# Patient Record
Sex: MALE | Race: WHITE
[De-identification: names, ages, dates, MRNs, and addresses within clinical notes are randomized per-mention and may not be internally consistent; named-entity substitution may affect disease eponyms.]

---

## 2017-05-18 ENCOUNTER — HOSPITAL ENCOUNTER (EMERGENCY)
Dept: HOSPITAL 42 - ED | Age: 62
LOS: 1 days | Discharge: HOME | End: 2017-05-19
Payer: MEDICAID

## 2017-05-18 VITALS
RESPIRATION RATE: 16 BRPM | TEMPERATURE: 97.7 F | DIASTOLIC BLOOD PRESSURE: 72 MMHG | HEART RATE: 72 BPM | SYSTOLIC BLOOD PRESSURE: 134 MMHG

## 2017-05-18 VITALS — BODY MASS INDEX: 28.7 KG/M2

## 2017-05-18 DIAGNOSIS — K11.8: Primary | ICD-10-CM

## 2017-05-18 LAB
ADD MANUAL DIFF?: NO
ALBUMIN/GLOB SERPL: 1.1 {RATIO} (ref 1.1–1.8)
ALP SERPL-CCNC: 66 U/L (ref 38–133)
ALT SERPL-CCNC: 32 U/L (ref 7–56)
AST SERPL-CCNC: 20 U/L (ref 15–59)
BASOPHILS # BLD AUTO: 0.03 K/MM3 (ref 0–2)
BASOPHILS NFR BLD: 0.4 % (ref 0–3)
BILIRUB SERPL-MCNC: 0.6 MG/DL (ref 0.2–1.3)
BUN SERPL-MCNC: 17 MG/DL (ref 7–21)
CALCIUM SERPL-MCNC: 9.4 MG/DL (ref 8.4–10.5)
CHLORIDE SERPL-SCNC: 99 MMOL/L (ref 98–107)
CO2 SERPL-SCNC: 28 MMOL/L (ref 21–33)
EOSINOPHIL # BLD: 0.2 10*3/UL (ref 0–0.7)
EOSINOPHIL NFR BLD: 2.2 % (ref 1.5–5)
ERYTHROCYTE [DISTWIDTH] IN BLOOD BY AUTOMATED COUNT: 13.4 % (ref 11.5–14.5)
GLOBULIN SER-MCNC: 3.7 GM/DL
GLUCOSE SERPL-MCNC: 178 MG/DL (ref 70–110)
GRANULOCYTES # BLD: 4.83 10*3/UL (ref 1.4–6.5)
GRANULOCYTES NFR BLD: 60.2 % (ref 50–68)
HCT VFR BLD CALC: 44.7 % (ref 42–52)
LYMPHOCYTES # BLD: 2.5 10*3/UL (ref 1.2–3.4)
LYMPHOCYTES NFR BLD AUTO: 31.6 % (ref 22–35)
MCH RBC QN AUTO: 29.7 PG (ref 25–35)
MCHC RBC AUTO-ENTMCNC: 34.5 G/DL (ref 31–37)
MCV RBC AUTO: 86.3 FL (ref 80–105)
MONOCYTES # BLD AUTO: 0.5 10*3/UL (ref 0.1–0.6)
MONOCYTES NFR BLD: 5.6 % (ref 1–6)
PLATELET # BLD: 251 10^3/UL (ref 120–450)
PMV BLD AUTO: 11.2 FL (ref 7–11)
POTASSIUM SERPL-SCNC: 4.6 MMOL/L (ref 3.6–5)
PROT SERPL-MCNC: 7.7 G/DL (ref 5.8–8.3)
SODIUM SERPL-SCNC: 138 MMOL/L (ref 132–148)
WBC # BLD AUTO: 8 10^3/UL (ref 4.5–11)

## 2017-05-18 PROCEDURE — 70491 CT SOFT TISSUE NECK W/DYE: CPT

## 2017-05-18 PROCEDURE — 99283 EMERGENCY DEPT VISIT LOW MDM: CPT

## 2017-05-18 PROCEDURE — 93971 EXTREMITY STUDY: CPT

## 2017-05-18 PROCEDURE — 85025 COMPLETE CBC W/AUTO DIFF WBC: CPT

## 2017-05-18 PROCEDURE — 80053 COMPREHEN METABOLIC PANEL: CPT

## 2017-05-18 NOTE — ED PDOC
Arrival/HPI





<Bj Jose DO - Last Filed: 05/19/17 00:07>





- General


Historian: Patient





- History of Present Illness


Time/Duration: > month


Symptom Onset: Gradual


Symptom Course: Worsening


Context: Home





<Doyle Ro - Last Filed: 05/21/17 15:46>





- General


Chief Complaint: ENT Problem


Time Seen by Provider: 05/18/17 19:58





- History of Present Illness


Narrative History of Present Illness (Text): 





05/18/17 20:00


This 62 yo male with pmh tobacco use,  presents to this ED c/o left mandibular 

mass x 4-5 weeks.  Patient stated mass has worsen within last few days.  Denies 

ear pain, sore throat, cough, sob, cp, abdominal pain, dizziness, or skin rash.


Patient noted left calf pain x 4 weeks. (Doyle Ro)





Past Medical History





- Provider Review


Nursing Documentation Reviewed: Yes





- Cardiac


Hx Cardiac Disorders: No





- Pulmonary


Hx Respiratory Disorders: No





- Neurological


Hx Neurological Disorder: No





- HEENT


Hx HEENT Disorder: No





- Renal


Hx Renal Disorder: No





- Endocrine/Metabolic


Hx Diabetes Mellitus Type 2: Yes





- Hematological/Oncological


Hx Blood Disorders: No





- Integumentary


Hx Dermatological Disorder: No





- Musculoskeletal/Rheumatological


Hx Musculoskeletal Disorders: No





- Gastrointestinal


Hx Gastrointestinal Disorders: No





- Genitourinary/Gynecological


Hx Genitourinary Disorders: No





- Psychiatric


Hx Psychophysiologic Disorder: No


Hx Substance Use: No





- Anesthesia


Hx Anesthesia: No


Hx Anesthesia Reactions: No


Hx Malignant Hyperthermia: No





<Doyle Ro P - Last Filed: 05/21/17 15:46>





Family/Social History





- Physician Review


Nursing Documentation Reviewed: Yes


Family/Social History: No Known Family HX


Smoking Status: Heavy Smoker > 10 Cigarettes Daily


Hx Alcohol Use: No


Hx Substance Use: No





<Doyle Ro P - Last Filed: 05/21/17 15:46>





Allergies/Home Meds





<Bj Jose DO - Last Filed: 05/19/17 00:07>





<Doyle Ro - Last Filed: 05/21/17 15:46>


Allergies/Adverse Reactions: 


Allergies





No Known Allergies Allergy (Verified 05/18/17 19:48)


 








Home Medications: 


 Home Meds











 Medication  Instructions  Recorded  Confirmed


 


Famotidine [Pepcid AC] 0 PO 05/18/17 














Review of Systems





- Review of Systems


Constitutional: Normal.  absent: Fatigue, Weight Change, Fevers


Eyes: Normal


ENT: Other (Left neck mass)


Respiratory: Normal.  absent: SOB, Cough


Cardiovascular: Normal.  absent: Chest Pain, Palpitations


Gastrointestinal: Normal.  absent: Abdominal Pain, Nausea, Vomiting


Genitourinary Male: Normal.  absent: Dysuria


Musculoskeletal: Other (Left calf pain).  absent: Back Pain, Neck Pain, Joint 

Swelling


Skin: Normal, Other (See HPI)


Neurological: Normal


Endocrine: Normal


Hemo/Lymphatic: Normal


Psychiatric: Normal





<Ro,Nahim P - Last Filed: 05/21/17 15:46>





Physical Exam


Temperature: Afebrile


Blood Pressure: Normal


Pulse: Regular


Respiratory Rate: Normal


Appearance: Positive for: Well-Appearing, Non-Toxic, Comfortable


Pain Distress: None


Mental Status: Positive for: Alert and Oriented X 3





- Systems Exam


Head: Present: Atraumatic, Normocephalic


Pupils: Present: PERRL


Extroacular Muscles: Present: EOMI


Conjunctiva: Present: Normal


Mouth: Present: Moist Mucous Membranes


Neck: Present: Normal Range of Motion


Respiratory/Chest: Present: Clear to Auscultation, Good Air Exchange.  No: 

Respiratory Distress, Accessory Muscle Use


Cardiovascular: Present: Regular Rate and Rhythm, Normal S1, S2.  No: Murmurs


Abdomen: Present: Normal Bowel Sounds.  No: Tenderness, Distention, Peritoneal 

Signs


Back: Present: Normal Inspection


Upper Extremity: Present: Normal Inspection.  No: Cyanosis, Edema


Lower Extremity: Present: Normal Inspection.  No: Edema


Neurological: Present: GCS=15, CN II-XII Intact, Speech Normal


Skin: Present: Warm, Dry, Normal Color.  No: Rashes


Psychiatric: Present: Alert, Oriented x 3, Normal Insight, Normal Concentration





<Ro,Nahim P - Last Filed: 05/21/17 15:46>


Vital Signs











  Temp Pulse Resp BP Pulse Ox


 


 05/18/17 23:20    16   98


 


 05/18/17 20:03  97.7 F  72  16  134/72  99














Medical Decision Making





- RAD Interpretation


: Radiologist





<Bj Jose DO - Last Filed: 05/19/17 00:07>


Re-evaluation Time: 02:15


Reassessment Condition: Re-examined, Unchanged





<Ro,Nahim P - Last Filed: 05/21/17 15:46>


ED Course and Treatment: 





05/19/17 00:08


CT neck results reviewed:


IMPRESSION:


2.3 x 3 x 3.4 cm enhancing mass within the left parotid gland. Question 

pleomorphic adenoma


versus other tumor. Tissue sampling will aid in evaluation.


 (Bj Jose DO)





05/18/17 21:18


pending labs, CT neck scan.  


Lower extremity venous doppler was negative as per ultrasound.


Dr. Jose is aware of this case, and he will f/u labs, and CT imaging 

result. (Doyle Ro)





- Lab Interpretations


Lab Results: 








 05/18/17 21:20 





 05/18/17 21:20 





 Lab Results





05/18/17 21:20: WBC 8.0, RBC 5.18, Hgb 15.4, Hct 44.7, MCV 86.3, MCH 29.7, MCHC 

34.5, RDW 13.4, Plt Count 251, MPV 11.2 H, Gran % 60.2, Lymph % (Auto) 31.6, 

Mono % (Auto) 5.6, Eos % (Auto) 2.2, Baso % (Auto) 0.4, Gran # 4.83, Lymph # 2.5

, Mono # 0.5, Eos # 0.2, Baso # 0.03


05/18/17 21:20: Sodium 138, Potassium 4.6, Chloride 99, Carbon Dioxide 28, 

Anion Gap 16, BUN 17, Creatinine 0.8, Est GFR (African Amer) > 60, Est GFR (Non-

Af Amer) > 60, Random Glucose 178 H, Calcium 9.4, Total Bilirubin 0.6, AST 20, 

ALT 32, Alkaline Phosphatase 66, Total Protein 7.7, Albumin 4.0, Globulin 3.7, 

Albumin/Globulin Ratio 1.1











- RAD Interpretation


Narrative RAD Interpretations (Text): 


EXAM:


CT Neck With Intravenous Contrast





FINDINGS:


Airway is unremarkable as visualized. No significant tonsillar enlargement. No 

peritonsillar abscess.


Normal epiglottis. Retropharyngeal space is unremarkable.


2.3 x 3 x 3.4 cm enhancing mass within the left parotid gland.


Shotty nodes.


Thyroid demonstrates no enlarged or calcified nodules.


Osseous structures intact.





IMPRESSION:


2.3 x 3 x 3.4 cm enhancing mass within the left parotid gland. Question 

pleomorphic adenoma


versus other tumor. Tissue sampling will aid in evaluation. (Bj Jose DO)


Radiology Orders: 








05/18/17 19:59


NECK SOFT TISSUE W/CONTRAST [CT] Stat 


DUPLEX LOWER EXTRM VEIN LEFT [US] Stat 














- Medication Orders


Current Medication Orders: 











Discontinued Medications





Iohexol (Omnipaque 350 100 Ml) Confirm Administered Dose 350 mg .ROUTE .STK-MED 

ONE


   Stop: 05/18/17 22:40











Disposition/Present on Arrival





<Bj Jose DO - Last Filed: 05/19/17 00:07>





- Present on Arrival


Any Indicators Present on Arrival: No


History of DVT/PE: No


History of Uncontrolled Diabetes: No


Urinary Catheter: No


History of Decub. Ulcer: No


History Surgical Site Infection Following: None





- Disposition


Have Diagnosis and Disposition been Completed?: Yes


Disposition Time: 03:40





<Doyle Ro - Last Filed: 05/21/17 15:46>





- Disposition


Diagnosis: 


 Parotid mass





Disposition: HOME/ ROUTINE


Condition: GOOD


Additional Instructions: 





Thank you for letting us take care of you today.  You were treated for a 

parotid mass. The emergency medical care you received today was directed at 

your acute symptoms. If you were prescribed any medication, please fill it and 

take as directed. It may take several days for your symptoms to resolve. Return 

to the Emergency Department if your symptoms worsen, do not improve, or if you 

have any other problems.





Please contact your doctor or call one of the physicians/clinics you have been 

referred to that are listed on the Patient Visit Information form that is 

included in your discharge packet. Bring any paperwork you were given at 

discharge with you along with any medications you are taking to your follow up 

visit. Our treatment cannot replace ongoing medical care by a primary care 

provider (PCP) outside of the emergency department.





Thank you for allowing the [facility] team to be part of your care today.

















Follow up with Dr. Cui (ENT) in 2-3 days for further evaluation of the 

mass on the side of your jaw.











Return to the emergency room if you have any concerns.


Prescriptions: 


Ibuprofen [Motrin] 600 mg PO Q6 PRN #20 tab


 PRN Reason: Pain, Moderate (4-7)


Referrals: 


Erich Cui DO [Staff Provider] - Follow up with primary


PCP,NO [Primary Care Provider] - Follow up with primary

## 2017-05-18 NOTE — CT
EXAM:

  CT Neck With Intravenous Contrast



CLINICAL HISTORY:

  61 years old, male; Signs and symptoms; Mass, lump, or swelling in neck; 

Additional info: Left madibular mass



TECHNIQUE:

  Axial computed tomography images of the neck with intravenous contrast.  This 

CT exam was performed using one or more of the following dose reduction 

techniques:  automated exposure control, adjustment of the mA and/or kV 

according to patient size, and/or use of iterative reconstruction technique.

  Coronal and sagittal reformatted images were created and reviewed.



CONTRAST:

  96 mL of OMNI 350 administered intravenously.



COMPARISON:

  No relevant prior studies available.



FINDINGS:



Airway is unremarkable as visualized.  No significant tonsillar enlargement.  

No peritonsillar abscess. Normal epiglottis.  Retropharyngeal space is 

unremarkable.

2.3 x 3 x 3.4 cm enhancing mass within the left parotid gland.

Shotty nodes.

Thyroid demonstrates no enlarged or calcified nodules.

Osseous structures intact.



IMPRESSION:     



2.3 x 3 x 3.4 cm enhancing mass within the left parotid gland.  Question 

pleomorphic adenoma versus other tumor.  Tissue sampling will aid in evaluation.

## 2017-05-19 VITALS — OXYGEN SATURATION: 98 %

## 2017-05-19 NOTE — US
PROCEDURE:  Left lower extremity venous US



HISTORY:

Leg pain and swelling. Evaluate for DVT.



PHYSICIAN(S):  Ean Marin MD.



TECHNIQUE:

Duplex sonography and color-flow Doppler with graded compression were 

used to evaluate the deep venous system of the left lower extremity. 



FINDINGS:

The visualized deep venous system of the left lower extremity is 

sonographically normal and compressible. Normal wave forms and 

augmentation are seen. There is no sonographic evidence for deep 

venous thrombosis in the visualized segments of the left lower 

extremity.



IMPRESSION:

1. No sonographic evidence for deep venous thrombosis in the 

visualized segments of the left lower extremity.

## 2018-08-07 ENCOUNTER — HOSPITAL ENCOUNTER (INPATIENT)
Dept: HOSPITAL 42 - ED | Age: 63
LOS: 2 days | Discharge: HOME | DRG: 853 | End: 2018-08-09
Attending: INTERNAL MEDICINE | Admitting: INTERNAL MEDICINE
Payer: MEDICAID

## 2018-08-07 VITALS — BODY MASS INDEX: 27.1 KG/M2

## 2018-08-07 DIAGNOSIS — Z91.19: ICD-10-CM

## 2018-08-07 DIAGNOSIS — F17.210: ICD-10-CM

## 2018-08-07 DIAGNOSIS — Z79.4: ICD-10-CM

## 2018-08-07 DIAGNOSIS — K21.9: ICD-10-CM

## 2018-08-07 DIAGNOSIS — E11.65: ICD-10-CM

## 2018-08-07 DIAGNOSIS — I21.4: Primary | ICD-10-CM

## 2018-08-07 DIAGNOSIS — Z95.5: ICD-10-CM

## 2018-08-07 DIAGNOSIS — E66.9: ICD-10-CM

## 2018-08-07 DIAGNOSIS — E78.5: ICD-10-CM

## 2018-08-07 DIAGNOSIS — Z83.3: ICD-10-CM

## 2018-08-07 DIAGNOSIS — I10: ICD-10-CM

## 2018-08-07 DIAGNOSIS — Z87.11: ICD-10-CM

## 2018-08-07 DIAGNOSIS — I25.110: ICD-10-CM

## 2018-08-07 DIAGNOSIS — Z79.82: ICD-10-CM

## 2018-08-07 LAB
ALBUMIN SERPL-MCNC: 4 G/DL (ref 3–4.8)
ALBUMIN/GLOB SERPL: 1.3 {RATIO} (ref 1.1–1.8)
ALT SERPL-CCNC: 23 U/L (ref 7–56)
APTT BLD: 25.1 SECONDS (ref 25.1–36.5)
AST SERPL-CCNC: 16 U/L (ref 17–59)
BASOPHILS # BLD AUTO: 0.03 K/MM3 (ref 0–2)
BASOPHILS NFR BLD: 0.4 % (ref 0–3)
BUN SERPL-MCNC: 15 MG/DL (ref 7–21)
CALCIUM SERPL-MCNC: 9.4 MG/DL (ref 8.4–10.5)
EOSINOPHIL # BLD: 0.1 10*3/UL (ref 0–0.7)
EOSINOPHIL NFR BLD: 0.9 % (ref 1.5–5)
ERYTHROCYTE [DISTWIDTH] IN BLOOD BY AUTOMATED COUNT: 13.4 % (ref 11.5–14.5)
GFR NON-AFRICAN AMERICAN: > 60
GRANULOCYTES # BLD: 5.94 10*3/UL (ref 1.4–6.5)
GRANULOCYTES NFR BLD: 75.6 % (ref 50–68)
HDLC SERPL-MCNC: 31 MG/DL (ref 29–60)
HGB BLD-MCNC: 14.4 G/DL (ref 14–18)
INR PPP: 1.05
LDLC SERPL-MCNC: 107 MG/DL (ref 0–129)
LYMPHOCYTES # BLD: 1.2 10*3/UL (ref 1.2–3.4)
LYMPHOCYTES NFR BLD AUTO: 15 % (ref 22–35)
MCH RBC QN AUTO: 29.3 PG (ref 25–35)
MCHC RBC AUTO-ENTMCNC: 34.6 G/DL (ref 31–37)
MCV RBC AUTO: 84.6 FL (ref 80–105)
MONOCYTES # BLD AUTO: 0.6 10*3/UL (ref 0.1–0.6)
MONOCYTES NFR BLD: 8.1 % (ref 1–6)
PLATELET # BLD: 193 10^3/UL (ref 120–450)
PMV BLD AUTO: 11 FL (ref 7–11)
PROTHROMBIN TIME: 12 SECONDS (ref 9.4–12.5)
RBC # BLD AUTO: 4.92 10^6/UL (ref 3.5–6.1)
TROPONIN I SERPL-MCNC: 0.01 NG/ML
WBC # BLD AUTO: 7.9 10^3/UL (ref 4.5–11)

## 2018-08-07 RX ADMIN — INSULIN HUMAN SCH: 100 INJECTION, SOLUTION PARENTERAL at 22:00

## 2018-08-07 NOTE — ED PDOC
Arrival/HPI





- General


Historian: Patient, Family





- History of Present Illness


Time/Duration: 1 hour


Symptom Onset: Sudden


Symptom Course: Resolved


Quality: Aching, Cramping, Gas Like


Severity Level: 4


Context: Standing, Walking





<Antony House - Last Filed: 08/07/18 17:49>





<Crsitiano Pavon - Last Filed: 08/07/18 18:20>





- General


Chief Complaint: Weakness/Neurological Deficit


Time Seen by Provider: 08/07/18 15:20





- History of Present Illness


Narrative History of Present Illness (Text): 





08/07/18 15:43


62 year-old male with PMH of DM2 and GERD presents to the ED complaining of 

general weakness, chest discomfort, and dizziness. Approximately one hour ago, 

the patient had just finished having a big lunch, went outside, and started 

pulling weeds in his yard. He began feeling nauseous and short of breath. 

Patient vomited and then went inside his home where he collapsed. Patient 

denies falling and hitting his head or losing consciousness. Patient currently 

states that he is feeling better but still feels very weak and his chest 

discomfort feels like acid reflux, which he deals with often. Patient admits to 

nausea, vomiting, dizziness, chest discomfort, weakness, and fatigue. Patient 

is also a smoker and reports that he smokes one pack per day for many years.


 (Antony House)





Past Medical History





- Provider Review


Nursing Documentation Reviewed: Yes





- Travel History


Have you recently traveled outside [place] w/in the past 3 mons?: No





- Cardiac


Hx Cardiac Disorders: No





- Pulmonary


Hx Respiratory Disorders: No





- Neurological


Hx Neurological Disorder: No





- HEENT


Hx HEENT Disorder: No





- Renal


Hx Renal Disorder: No





- Endocrine/Metabolic


Hx Diabetes Mellitus Type 2: Yes





- Hematological/Oncological


Hx Blood Disorders: No





- Integumentary


Hx Dermatological Disorder: No





- Musculoskeletal/Rheumatological


Hx Musculoskeletal Disorders: No





- Gastrointestinal


Hx Gastrointestinal Disorders: No





- Genitourinary/Gynecological


Hx Genitourinary Disorders: No





- Psychiatric


Hx Psychophysiologic Disorder: No


Hx Substance Use: No





- Anesthesia


Hx Anesthesia: No


Hx Anesthesia Reactions: No


Hx Malignant Hyperthermia: No





<Antony House - Last Filed: 08/07/18 17:49>





Family/Social History





- Physician Review


Nursing Documentation Reviewed: Yes


Family/Social History: Diabetes (mother)


Smoking Status: Heavy Smoker > 10 Cigarettes Daily


Hx Alcohol Use: No


Hx Substance Use: No





<Antony House - Last Filed: 08/07/18 17:49>





Allergies/Home Meds





<Antony House - Last Filed: 08/07/18 17:49>





<Cristiano Pavon - Last Filed: 08/07/18 18:20>


Allergies/Adverse Reactions: 


Allergies





No Known Allergies Allergy (Verified 08/07/18 15:27)


 











Review of Systems





- Review of Systems


Constitutional: absent: Fatigue, Fevers, Night Sweats


Eyes: absent: Vision Changes, Photophobia


ENT: absent: Hearing Changes, Sore Throat


Respiratory: SOB.  absent: Cough


Cardiovascular: Chest Pain.  absent: Palpitations, Edema, Syncope


Gastrointestinal: Nausea, Vomiting.  absent: Abdominal Pain


Genitourinary Male: absent: Dysuria


Musculoskeletal: absent: Arthralgias


Skin: absent: Rash, Pruritis


Neurological: Dizziness.  absent: Headache, Speech Changes, Facial Droop


Endocrine: Diaphoresis


Hemo/Lymphatic: absent: Adenopathy


Psychiatric: absent: Anxiety, Depression





<Antony House - Last Filed: 08/07/18 17:49>





- Physician Review


All systems were reviewed & negative as marked: Yes





<Cristiano Pavon - Last Filed: 08/07/18 18:20>





Physical Exam


Vital Signs Reviewed: Yes





<Antony House - Last Filed: 08/07/18 17:49>


Temperature: Afebrile


Blood Pressure: Normal


Pulse: Regular


Respiratory Rate: Normal


Appearance: Positive for: Well-Appearing, Non-Toxic, Uncomfortable, Other (

Diaphoretic)


Pain Distress: None


Mental Status: Positive for: Alert and Oriented X 3





- Systems Exam


Head: Present: Atraumatic, Normocephalic


Pupils: Present: PERRL


Extroacular Muscles: Present: EOMI


Conjunctiva: Present: Normal


Ears: Present: NORMAL TM, Normal Canal.  No: Erythema


Mouth: Present: Moist Mucous Membranes


Pharnyx: No: ERYTHEMA, EXUDATE, TONSILS ENLARGED


Neck: Present: Normal Range of Motion


Respiratory/Chest: Present: Clear to Auscultation, Good Air Exchange, Decreased 

Breath Sounds.  No: Respiratory Distress, Accessory Muscle Use


Cardiovascular: Present: Regular Rate and Rhythm, Normal S1, S2.  No: Murmurs


Abdomen: No: Tenderness, Distention, Peritoneal Signs, Rebound, Guarding


Back: Present: Normal Inspection


Upper Extremity: Present: Normal Inspection.  No: Cyanosis, Edema


Lower Extremity: Present: Normal Inspection.  No: Edema


Neurological: Present: GCS=15, CN II-XII Intact, Speech Normal, Motor Func 

Grossly Intact


Skin: Present: Warm, Normal Color, Diaphoretic.  No: Rashes


Psychiatric: Present: Alert, Oriented x 3, Normal Insight, Normal Concentration





<Cristiano Pavon - Last Filed: 08/07/18 18:20>


Vital Signs











  Temp Pulse Resp BP Pulse Ox


 


 08/07/18 17:08  97.7 F    


 


 08/07/18 16:57   75  18  127/64  97


 


 08/07/18 16:17   72  15   96


 


 08/07/18 15:17     128/88 














Medical Decision Making





- Lab Interpretations


I have reviewed the lab results: Yes


Interpretation: Abnormal lab values (hyperglycemia)





- RAD Interpretation


: Radiologist





- EKG Interpretation


Interpreted by ED Physician: Yes


Type: 12 lead EKG


Comparison: No previous EKG avail.





<Antony House - Last Filed: 08/07/18 17:49>





<Cristiano Pavon - Last Filed: 08/07/18 18:20>


ED Course and Treatment: 








08/07/18 16:47


-Suspicion for ACS


-EKG reviewed by STEMI cardiologist, no need for cath at this time


-Troponin pending


-Patient reports pain has improved s/p ASA and nitro paste


08/07/18 17:06


-Spoke with Dr. Singletary who agrees to admission (Antony House)





08/07/18 16:41


Seen and examined with the resident. Our history and physical exam reveals a 

gentleman with acute onset of severe diaphoresis nausea and vomiting chest pain 

near syncope and shortness of breath. He is a heavy smoker. His EKG shows 

diffuse minimal ST elevations. The EKG was reviewed by the STEMI cardiologist 

, who does not want to take the patient to the lab at this time. (

Cristiano Pavon)





- Lab Interpretations


Lab Results: 








 08/07/18 15:57 





 08/07/18 15:57 





 Lab Results





08/07/18 15:57: Lipase 249


08/07/18 15:57: PT 12.0, INR 1.05, APTT 25.1


08/07/18 15:57: WBC 7.9, RBC 4.92, Hgb 14.4, Hct 41.6 L, MCV 84.6, MCH 29.3, 

MCHC 34.6, RDW 13.4, Plt Count 193, MPV 11.0, Gran % 75.6 H, Lymph % (Auto) 

15.0 L, Mono % (Auto) 8.1 H, Eos % (Auto) 0.9 L, Baso % (Auto) 0.4, Gran # 5.94

, Lymph # (Auto) 1.2, Mono # (Auto) 0.6, Eos # (Auto) 0.1, Baso # (Auto) 0.03


08/07/18 15:57: Sodium 140, Potassium 4.1, Chloride 103, Carbon Dioxide 28, 

Anion Gap 14, BUN 15, Creatinine 1.0, Est GFR (African Amer) > 60, Est GFR (Non-

Af Amer) > 60, Random Glucose 233 H, Calcium 9.4, Phosphorus 2.3 L, Magnesium 

2.0, Total Bilirubin 0.4, AST 16 L, ALT 23, Alkaline Phosphatase 62, Lactate 

Dehydrogenase 364, Total Creatine Kinase 62, Troponin I 0.01, Total Protein 7.0

, Albumin 4.0, Globulin 3.0, Albumin/Globulin Ratio 1.3











- RAD Interpretation


Narrative RAD Interpretations (Text): 





08/07/18 17:07


CXR without acute findings (Antony House)


Radiology Orders: 








08/07/18 15:37


CHEST PORTABLE [RAD] Stat 














- EKG Interpretation


EKG Interpretation (Text): 





08/07/18 17:07


Normal sinus but with some ST abnormalities in II, III, reviewed with STEMI 

cardiologist (Antony House)





- Medication Orders


Current Medication Orders: 








Aspirin (Ecotrin)  81 mg PO DAILY RUSSELL


Insulin Human Regular (Humulin R Med)  0 units SC ACHS RUSSELL


   PRN Reason: Protocol





Discontinued Medications





Aspirin (Aspirin)  325 mg PO STAT STA


   Stop: 08/07/18 15:56


   Last Admin: 08/07/18 16:09  Dose: 325 mg





Nitroglycerin (Nitro-Bid 2% Oint)  1 ea TOP STAT STA


   Stop: 08/07/18 16:00


   Last Admin: 08/07/18 16:09  Dose: 1 ea











Disposition/Present on Arrival





- Present on Arrival


Any Indicators Present on Arrival: No


History of DVT/PE: No


History of Uncontrolled Diabetes: No


Urinary Catheter: No


History of Decub. Ulcer: No


History Surgical Site Infection Following: None





- Disposition


Disposition Time: 17:09


Patient Plan: Admission





<Antony House - Last Filed: 08/07/18 17:49>





- Present on Arrival


Any Indicators Present on Arrival: No


History of DVT/PE: No


History of Uncontrolled Diabetes: Yes


Urinary Catheter: No


History of Decub. Ulcer: No





- Disposition


Have Diagnosis and Disposition been Completed?: Yes


Patient Plan: Observation, Telemetry





<Cristiano Pavon - Last Filed: 08/07/18 18:20>





- Disposition


Diagnosis: 


 Weakness, High risk of cardiac event, Chest pain, Dyspnea, Diaphoresis, Nausea 

and vomiting, Hyperglycemia





Disposition: HOSPITALIZED


Patient Problems: 


 Current Active Problems











Problem Status Onset


 


Chest pain Acute  


 


Diaphoresis Acute  


 


Dyspnea Acute  


 


High risk of cardiac event Acute  


 


Hyperglycemia Acute  


 


Nausea and vomiting Acute  


 


Weakness Acute  











Condition: FAIR

## 2018-08-07 NOTE — CP.PCM.HP
<Micheal Camacho - Last Filed: 08/07/18 19:19>





History of Present Illness





- History of Present Illness


History of Present Illness: 


Micheal Camacho, PGY-1 History and Physical for Hospitalist Service





CC: substernal chest pain Shortness of breath, dizziness





HPI: 62 M with PMHx of GERD, PUD, and DM2 who presented with non-radiating, non-

reproducible substernal chest pain, full body weakness and dizziness after 

weeding his garden this afternoon. Per the patient, patient went outside to do 

house chores after eating a big meal. Patient typically waits to digest food 

due to his history of indigestion but was in a rush today. Patient was outside 

in the heat and felt nauseous and weak, at which point patient sat on the 

ground and vomited. Vomit was one time nonbloody and consisted of food he had 

for lunch. Patient reported relief at that point. Patient denies falling, 

hitting his head, loss of consciousness, urinary or bowel incontinence or 

vision changes. 


Patient's wife called for an ambulance. Patient received  and nitropaste

, which patient reports complete relief from.


Patient denies any previous cardiac issues or family hx of similar issues.








PMHx: GERD, DM2, L sided UE tremor


PSHx: unknown


All: NKDA


Social: tobacco 1 pk/day, denies ETOH and substance abuse.


Fam Hx: mom has DM, otherwise unknown


Home Meds: Pepcid 20, Glipizide 5, Motrin PRN








PMD: Dr. Rodriguez


Pharm: None 


 








Present on Admission





- Present on Admission


Any Indicators Present on Admission: No


History of Uncontrolled Diabetes: No





Review of Systems





- Constitutional


Constitutional: absent: Anorexia, Chills, Frequent Falls, Weight Loss, Weakness





- EENT


Eyes: absent: Other Visual Disturbances, Loss of Vision


Ears: Dizziness (has resolved since inciting event this afternoon).  absent: 

Ear Pain


Nose/Mouth/Throat: absent: Dry Mouth, Dysphagia, Hoarsness, Mouth Pain





- Cardiovascular


Cardiovascular: Chest Pain (resolved), Lightheadedness (improved).  absent: Leg 

Edema, Orthopnea, Palpitations





- Respiratory


Respiratory: absent: Cough, Hemoptysis, Stridor, Pain with Coughing





- Gastrointestinal


Gastrointestinal: absent: Abdominal Pain, Bloating





- Genitourinary


Genitourinary: absent: Change in Urinary Stream, Difficulty Urinating


Additional comments: 





reports harder stools recently





- Musculoskeletal


Musculoskeletal: absent: Back Pain, Numbness





- Neurological


Neurological: absent: Numbness, Focal Weakness, Headaches





Past Patient History





- Past Social History


Smoking Status: Heavy Smoker > 10 Cigarettes Daily





- CARDIAC


Hx Cardiac Disorders: No





- PULMONARY


Hx Respiratory Disorders: No





- NEUROLOGICAL


Hx Neurological Disorder: No





- HEENT


Hx HEENT Problems: No





- RENAL


Hx Chronic Kidney Disease: No





- ENDOCRINE/METABOLIC


Hx Diabetes Mellitus Type 2: Yes





- HEMATOLOGICAL/ONCOLOGICAL


Hx Blood Disorders: No





- INTEGUMENTARY


Hx Dermatological Problems: No





- MUSCULOSKELETAL/RHEUMATOLOGICAL


Hx Musculoskeletal Disorders: No





- GASTROINTESTINAL


Hx Gastrointestinal Disorders: No





- GENITOURINARY/GYNECOLOGICAL


Hx Genitourinary Disorders: No





- PSYCHIATRIC


Hx Psychophysiologic Disorder: No


Hx Substance Use: No





- SURGICAL HISTORY


Hx Surgeries: No





- ANESTHESIA


Hx Anesthesia: No


Hx Anesthesia Reactions: No


Hx Malignant Hyperthermia: No





Meds


Allergies/Adverse Reactions: 


 Allergies











Allergy/AdvReac Type Severity Reaction Status Date / Time


 


No Known Allergies Allergy   Verified 08/07/18 15:27














Physical Exam





- Constitutional


Appears: Well, Non-toxic, No Acute Distress





- Head Exam


Head Exam: ATRAUMATIC, NORMAL INSPECTION, NORMOCEPHALIC





- Eye Exam


Eye Exam: EOMI, Normal appearance


Pupil Exam: PERRL





- ENT Exam


ENT Exam: Mucous Membranes Moist, Normal Exam





- Neck Exam


Neck exam: Positive for: Normal Inspection





- Respiratory Exam


Respiratory Exam: Clear to Auscultation Bilateral, NORMAL BREATHING PATTERN.  

absent: Rales, Rhonchi, Wheezes





- Cardiovascular Exam


Cardiovascular Exam: RRR, +S1, +S2





- GI/Abdominal Exam


GI & Abdominal Exam: Normal Bowel Sounds, Soft.  absent: Tenderness





- Extremities Exam


Extremities exam: Positive for: normal inspection.  Negative for: calf 

tenderness, joint swelling, pedal edema





- Back Exam


Back exam: NORMAL INSPECTION





- Neurological Exam


Neurological exam: Alert, CN II-XII Intact, Oriented x3





- Psychiatric Exam


Psychiatric exam: Normal Affect, Normal Mood





- Skin


Skin Exam: Dry, Intact, Normal Color, Warm





Results





- Vital Signs


Recent Vital Signs: 





 Last Vital Signs











Temp  97.7 F   08/07/18 17:08


 


Pulse  75   08/07/18 16:57


 


Resp  18   08/07/18 16:57


 


BP  127/64   08/07/18 16:57


 


Pulse Ox  97   08/07/18 16:57














- Labs


Result Diagrams: 


 08/07/18 15:57





 08/07/18 15:57





Assessment & Plan





- Assessment and Plan (Free Text)


Assessment: 


62 M with PMHx GERD, DM2 who presented with chest pain, dizziness. Currently in 

the ED, patient is comfortable and pain has resolved.





Chest Pain r/o ACS


 and nitropaste given which brought relief


EKG performed showed nonspecific T wave changes in inferior leads, which was 

read as not requiring emergent catheterization


troponin x1 negative. f/u 2nd and 3rd


f/u repeat EKG in AM


f/u lipid panel


HHD mod carb


ASA 81 to begin tomorrow


f/u AM labs


cardio consulted (Adria)





DM2


noncompliance on Glipizide


sugars 233, 106 per patient this AM 


accuchecks


IDD moderate


will continue to monitor





Unilateral Tremor


paroxysmal, new onset. No focal deficits


not evident during exam


f/u CT head w/o contrast





Ppx: 


Protonix


SCD





Patient seen, case reviewed, and plan discussed with Dr. Roy.





Micheal Camacho, PGY-1











<Dajuan Roy - Last Filed: 08/08/18 08:08>





Results





- Vital Signs


Recent Vital Signs: 





 Last Vital Signs











Temp  97.8 F   08/08/18 05:58


 


Pulse  64   08/08/18 05:58


 


Resp  18   08/08/18 05:58


 


BP  116/60   08/08/18 05:58


 


Pulse Ox  97   08/08/18 05:58














- Labs


Result Diagrams: 


 08/08/18 03:25





 08/08/18 03:25


Labs: 





 Laboratory Results - last 24 hr











  08/07/18 08/07/18 08/08/18





  21:05 21:37 03:25


 


WBC    8.1


 


RBC    4.64


 


Hgb    13.5 L


 


Hct    39.6 L


 


MCV    85.3


 


MCH    29.1


 


MCHC    34.1


 


RDW    13.6


 


Plt Count    178


 


MPV    10.7


 


Gran %    61.4


 


Lymph % (Auto)    29.8


 


Mono % (Auto)    7.0 H


 


Eos % (Auto)    1.4 L


 


Baso % (Auto)    0.4


 


Gran #    4.99


 


Lymph # (Auto)    2.4


 


Mono # (Auto)    0.6


 


Eos # (Auto)    0.1


 


Baso # (Auto)    0.03


 


Sodium   


 


Potassium   


 


Chloride   


 


Carbon Dioxide   


 


Anion Gap   


 


BUN   


 


Creatinine   


 


Est GFR ( Amer)   


 


Est GFR (Non-Af Amer)   


 


POC Glucose (mg/dL)   228 H 


 


Random Glucose   


 


Calcium   


 


Total Bilirubin   


 


AST   


 


ALT   


 


Alkaline Phosphatase   


 


Troponin I  0.23 H* D  


 


Total Protein   


 


Albumin   


 


Globulin   


 


Albumin/Globulin Ratio   


 


TSH 3rd Generation   














  08/08/18 08/08/18





  03:25 03:25


 


WBC  


 


RBC  


 


Hgb  


 


Hct  


 


MCV  


 


MCH  


 


MCHC  


 


RDW  


 


Plt Count  


 


MPV  


 


Gran %  


 


Lymph % (Auto)  


 


Mono % (Auto)  


 


Eos % (Auto)  


 


Baso % (Auto)  


 


Gran #  


 


Lymph # (Auto)  


 


Mono # (Auto)  


 


Eos # (Auto)  


 


Baso # (Auto)  


 


Sodium  141 


 


Potassium  4.0 


 


Chloride  108 H 


 


Carbon Dioxide  25 


 


Anion Gap  13 


 


BUN  14 


 


Creatinine  0.9 


 


Est GFR ( Amer)  > 60 


 


Est GFR (Non-Af Amer)  > 60 


 


POC Glucose (mg/dL)  


 


Random Glucose  127 H 


 


Calcium  8.8 


 


Total Bilirubin  0.5 


 


AST  15 L 


 


ALT  29 


 


Alkaline Phosphatase  51 


 


Troponin I  0.57 H* D 


 


Total Protein  6.5 


 


Albumin  3.6 


 


Globulin  2.9 


 


Albumin/Globulin Ratio  1.2 


 


TSH 3rd Generation   0.28 L














Attending/Attestation





- Attestation


I have personally seen and examined this patient.: Yes


I have fully participated in the care of the patient.: Yes


I have reviewed all pertinent clinical information: Yes


Notes (Text): 





08/08/18 08:04





Medical record note made by the resident after discussion with my direction and 

input after the patient was personally seen and examined by me. I have reviewed 

the chart and agree that the record accurately reflects by personal performance 

of the history, physical exam, data review, and medical decision-making, in the 

course for the patient. I have also personally directed the plan of care.





62 M with PMHx of GERD, PUD, and NIDDM is admitted with chest pain while 

working in his lawn.EKG showed ST changes in inferior lead.Patient is currently 

pain free.We will admit him to the telemetry, we will get serial cardiac 

enzyme.At this time, we will start patient on ASA and statin.We will also get 

cardiology evaluation.





Management plan was discussed in detail with patient. Education was provided.

## 2018-08-07 NOTE — CT
Date of service: 



08/07/2018



PROCEDURE:  CT HEAD WITHOUT CONTRAST.



HISTORY:

unilateral UE tremor



COMPARISON:

None available.



TECHNIQUE:

Axial computed tomography images were obtained through the head/brain 

without intravenous contrast.  



Coronal and sagittal reconstructed images.



Radiation dose:



Total exam DLP = 940.07 mGy-cm.



This CT exam was performed using one or more of the following dose 

reduction techniques: Automated exposure control, adjustment of the 

mA and/or kV according to patient size, and/or use of iterative 

reconstruction technique.



FINDINGS:



HEMORRHAGE:

No intracranial hemorrhage. 



BRAIN:

No mass effect or edema.  No atrophy or chronic microvascular 

ischemic changes.



VENTRICLES:

Unremarkable. No hydrocephalus. 



CALVARIUM:

Unremarkable.



PARANASAL SINUSES:

Unremarkable as visualized. No significant inflammatory changes.



MASTOID AIR CELLS:

Unremarkable as visualized. No inflammatory changes.



OTHER FINDINGS:

None.



IMPRESSION:

No significant or acute  findings to account for/ related to the 

clinical presentation.

## 2018-08-07 NOTE — RAD
Date of service: 



08/07/2018



HISTORY:

SOB, pre-syncopal episode  



COMPARISON:

No prior. 



FINDINGS:



LUNGS:

No active pulmonary disease.



PLEURA:

No significant pleural effusion identified, no pneumothorax apparent.



CARDIOVASCULAR:

Normal.



OSSEOUS STRUCTURES:

No significant abnormalities.



VISUALIZED UPPER ABDOMEN:

Normal.



OTHER FINDINGS:

None.



IMPRESSION:

No active disease.

## 2018-08-08 LAB
ALBUMIN SERPL-MCNC: 3.6 G/DL (ref 3–4.8)
ALBUMIN/GLOB SERPL: 1.2 {RATIO} (ref 1.1–1.8)
ALT SERPL-CCNC: 29 U/L (ref 7–56)
APPEARANCE UR: CLEAR
AST SERPL-CCNC: 15 U/L (ref 17–59)
BASOPHILS # BLD AUTO: 0.03 K/MM3 (ref 0–2)
BASOPHILS # BLD AUTO: 0.03 K/MM3 (ref 0–2)
BASOPHILS NFR BLD: 0.4 % (ref 0–3)
BASOPHILS NFR BLD: 0.4 % (ref 0–3)
BILIRUB UR-MCNC: NEGATIVE MG/DL
BUN SERPL-MCNC: 13 MG/DL (ref 7–21)
BUN SERPL-MCNC: 14 MG/DL (ref 7–21)
CALCIUM SERPL-MCNC: 8.4 MG/DL (ref 8.4–10.5)
CALCIUM SERPL-MCNC: 8.8 MG/DL (ref 8.4–10.5)
COLOR UR: (no result)
EOSINOPHIL # BLD: 0.1 10*3/UL (ref 0–0.7)
EOSINOPHIL # BLD: 0.2 10*3/UL (ref 0–0.7)
EOSINOPHIL NFR BLD: 1.4 % (ref 1.5–5)
EOSINOPHIL NFR BLD: 2.1 % (ref 1.5–5)
ERYTHROCYTE [DISTWIDTH] IN BLOOD BY AUTOMATED COUNT: 13.5 % (ref 11.5–14.5)
ERYTHROCYTE [DISTWIDTH] IN BLOOD BY AUTOMATED COUNT: 13.6 % (ref 11.5–14.5)
GFR NON-AFRICAN AMERICAN: > 60
GFR NON-AFRICAN AMERICAN: > 60
GLUCOSE UR STRIP-MCNC: 250 MG/DL
GRANULOCYTES # BLD: 4.43 10*3/UL (ref 1.4–6.5)
GRANULOCYTES # BLD: 4.99 10*3/UL (ref 1.4–6.5)
GRANULOCYTES NFR BLD: 57.8 % (ref 50–68)
GRANULOCYTES NFR BLD: 61.4 % (ref 50–68)
HGB BLD-MCNC: 12.8 G/DL (ref 14–18)
HGB BLD-MCNC: 13.5 G/DL (ref 14–18)
LEUKOCYTE ESTERASE UR-ACNC: NEGATIVE LEU/UL
LYMPHOCYTES # BLD: 2.4 10*3/UL (ref 1.2–3.4)
LYMPHOCYTES # BLD: 2.5 10*3/UL (ref 1.2–3.4)
LYMPHOCYTES NFR BLD AUTO: 29.8 % (ref 22–35)
LYMPHOCYTES NFR BLD AUTO: 33.2 % (ref 22–35)
MCH RBC QN AUTO: 29.1 PG (ref 25–35)
MCH RBC QN AUTO: 29.1 PG (ref 25–35)
MCHC RBC AUTO-ENTMCNC: 34 G/DL (ref 31–37)
MCHC RBC AUTO-ENTMCNC: 34.1 G/DL (ref 31–37)
MCV RBC AUTO: 85.3 FL (ref 80–105)
MCV RBC AUTO: 85.5 FL (ref 80–105)
MONOCYTES # BLD AUTO: 0.5 10*3/UL (ref 0.1–0.6)
MONOCYTES # BLD AUTO: 0.6 10*3/UL (ref 0.1–0.6)
MONOCYTES NFR BLD: 6.5 % (ref 1–6)
MONOCYTES NFR BLD: 7 % (ref 1–6)
PH UR STRIP: 5.5 [PH] (ref 4.7–8)
PLATELET # BLD: 167 10^3/UL (ref 120–450)
PLATELET # BLD: 178 10^3/UL (ref 120–450)
PMV BLD AUTO: 10.5 FL (ref 7–11)
PMV BLD AUTO: 10.7 FL (ref 7–11)
PROT UR STRIP-MCNC: NEGATIVE MG/DL
RBC # BLD AUTO: 4.4 10^6/UL (ref 3.5–6.1)
RBC # BLD AUTO: 4.64 10^6/UL (ref 3.5–6.1)
RBC # UR STRIP: NEGATIVE /UL
SP GR UR STRIP: 1.02 (ref 1–1.03)
TROPONIN I SERPL-MCNC: 0.57 NG/ML
UROBILINOGEN UR STRIP-ACNC: 0.2 E.U./DL
WBC # BLD AUTO: 7.7 10^3/UL (ref 4.5–11)
WBC # BLD AUTO: 8.1 10^3/UL (ref 4.5–11)

## 2018-08-08 PROCEDURE — 4A023N7 MEASUREMENT OF CARDIAC SAMPLING AND PRESSURE, LEFT HEART, PERCUTANEOUS APPROACH: ICD-10-PCS

## 2018-08-08 PROCEDURE — B2151ZZ FLUOROSCOPY OF LEFT HEART USING LOW OSMOLAR CONTRAST: ICD-10-PCS

## 2018-08-08 PROCEDURE — 3E033PZ INTRODUCTION OF PLATELET INHIBITOR INTO PERIPHERAL VEIN, PERCUTANEOUS APPROACH: ICD-10-PCS

## 2018-08-08 PROCEDURE — B2111ZZ FLUOROSCOPY OF MULTIPLE CORONARY ARTERIES USING LOW OSMOLAR CONTRAST: ICD-10-PCS

## 2018-08-08 PROCEDURE — 027135Z DILATION OF CORONARY ARTERY, TWO ARTERIES WITH TWO DRUG-ELUTING INTRALUMINAL DEVICES, PERCUTANEOUS APPROACH: ICD-10-PCS

## 2018-08-08 RX ADMIN — INSULIN HUMAN SCH: 100 INJECTION, SOLUTION PARENTERAL at 12:00

## 2018-08-08 RX ADMIN — INSULIN HUMAN SCH: 100 INJECTION, SOLUTION PARENTERAL at 18:12

## 2018-08-08 RX ADMIN — INSULIN HUMAN SCH: 100 INJECTION, SOLUTION PARENTERAL at 21:07

## 2018-08-08 RX ADMIN — INSULIN HUMAN SCH: 100 INJECTION, SOLUTION PARENTERAL at 08:30

## 2018-08-08 RX ADMIN — PANTOPRAZOLE SODIUM SCH MG: 40 TABLET, DELAYED RELEASE ORAL at 06:42

## 2018-08-08 NOTE — CARD
--------------- APPROVED REPORT --------------





Date of service: 08/07/2018



EKG Measurement

Heart [id number]

MD 156P8

[id number]

[id number]

[id number]



<Conclusion>

Normal sinus rhythm

Normal ECG

## 2018-08-08 NOTE — CPOSTOP
Copied To:  Dajuan Covington MD

Attending MD:  Dajuan Covington MD



DATE:  08/08/2018



CARDIOVASCULAR LAB POST PROCEDURE NOTE



DICTATING PHYSICIAN:  Dajuan Covington MD



SCRUB TECHNICIAN:  SIMONE Franklin.



TYPE OF ANESTHESIA:  Moderate conscious sedation, total dose 2 mg of Versed

and 50 of fentanyl given.



PRE-PROCEDURE DIAGNOSES:  Unstable angina, acute coronary syndrome, non-ST

elevation myocardial infarction.



PROCEDURES PERFORMED:

1.  Left heart catheterization.

2.  Stenting of left anterior descending artery, proximal.

3.  Stenting of proximal right coronary artery.



FINDINGS:  An 80% LAD, 95% proximal RCA, distal RCA .



FINAL DIAGNOSIS:  Multivessel coronary artery disease.



POST PROCEDURE CONDITION:  Stable.



VASCULAR ACCESS SITE:  Left radial.



CLOSURE DEVICE:  TR band.



TOTAL RADIATION DOSE:  99825.1 milligray unit.



FLUORO TIME:  13.4 minute.





__________________________________________

Dajuan Covington MD



DD:  08/08/2018 17:37:01

DT:  08/08/2018 17:38:19

Job # [id number]

## 2018-08-08 NOTE — CON
Copied To:  Dajuan Covington MD

Attending MD:  Dajuan Covington MD



DATE:  08/08/2018



REASON FOR THE CONSULTATION AND FOLLOWUP:  Acute coronary syndrome,

unstable angina.



BRIEF CLINICAL HISTORY:  This is a 62-year-old mild-to-moderate obese male

with past medical history significant for gastroesophageal reflux, peptic

ulcer disease, diabetes, presents with retrosternal chest pain associated

with nausea, vomiting one episode and he had vomited all the food that he

ate yesterday and now the patient is chest pain-free.  Denies any chest

pain, nausea, any shortness of breath.  Denies any palpitation.  Denies any

prior episode of dyspnea on exertion, chest pain on exertion.  The patient

said that he was working in the lawn and trying to get the weed out from

his garden, after that he became very nauseous, weak and whole body weight

weak and some mild chest discomfort and vomited all the food that he ate in

the lunch, after that he felt significant relief.  His wife called the

ambulance and got here.  Denies any episode of chest pain.



PAST MEDICAL HISTORY:  Significant for diabetes, gastroesophageal reflux,

also tremor in the left hand.



ALLERGIES:  NO KNOWN DRUG ALLERGY.



MEDICATIONS:  Currently patient takes Pepcid at home, glipizide and Motrin

p.r.n.



SOCIAL HISTORY:  Smokes a day, denies any history of alcohol abuse.



FAMILY HISTORY:  Diabetes.  No other significant coronary artery disease in

the family.



REVIEW OF SYSTEMS:  As per HPI.



PHYSICAL EXAMINATION:  As follows:

VITAL SIGNS:  Height of the patient 6 feet, weight of the patient 200

pounds, body mass index 30 kg/m2.  Temperature afebrile, heart rate 64,

blood pressure 116/60.

HEENT:  PERRLA.  Extraocular muscles intact.

NECK:  Supple.  No carotid bruits or thyromegaly.

CHEST:  Clear to auscultation.

HEART:  S1 and S2, regular.

ABDOMEN:  Soft.

EXTREMITIES:  Clubbing and cyanosis, negative.



LABORATORY DATA:  Blood workup:  WBC 8.1, hemoglobin 13.5, hematocrit 39.6,

platelet count 178.  Chemistry shows sodium 141, potassium 4, chloride 108,

carbon dioxide 25, anion gap of 13, BUN 14, creatinine 0.9, troponin 0.56. 

EKG shows normal sinus.



IMPRESSION:  Acute coronary syndrome, unstable angina, diabetes.



RECOMMENDATIONS:  The patient was given last night dose of Lovenox, started

load with Plavix and aspirin.  Discussed with the patient.  We will keep

n.p.o. after the breakfast.  Risks, benefits, and alternatives were

discussed with the patient, patient agreed.  We will proceed for cardiac

catheterization.  Further recommendation after the cardiac catheterization.

Hold Lovenox.  We will follow with you.  Also put low dose of beta-blocker.



Thank you, Dr. Singletary, for providing us the opportunity in taking care

of the patient, Georgina Munroe.





__________________________________________

Dajuan Covington MD



DD:  08/08/2018 9:18:05

DT:  08/08/2018 17:47:07

Job # [id number]

## 2018-08-08 NOTE — CARD
--------------- APPROVED REPORT --------------





Date of service: 08/08/2018



Procedure(s) performed: Left Heart Catheterization

PTCA with Stenting of Proximal LAD with DAT

PTCA with Stenting of Proximal RCA



HISTORY

The patient is a 62 year-old male with a history of : diabetes 

mellitus with insulin treatment , tobacco history() : The patient is 

a current smoker , hypertension , dyslipidemia , Admitted with ACS/ 

NSTEMI.



INDICATION

The indication(s) include : non-STEMI .



CASE TECHNIQUE

The patient was brought emergently to the Cardiac Catheterization 

Laboratory in a fasting state and was prepped and draped in a sterile 

manner. The left wrist was infiltrated with 2% Lidocaine subcutaneous 

anesthesia. A 6FR GLIDESHEATH ACCESS KIT sheath was inserted into the 

left radial artery without difficulty. Coronary angiography was 

performed using coronary diagnostic catheters. The left coronary 

system was accessed and visualized with a Diagnostic ,5F JL 4 CATH 

 CM catheter. The right coronary system was accessed and 

visualized with a Diagnostic ,5 Fr JR 4 catheter. The left ventricle 

was accessed and visualized with a Pig tail catheter catheter. Left 

ventricular/Aortic Valve gradient assessed on pullback. Left 

ventriculogram was performed in CHAMORRO projection. Closure device was 

deployed with a Fr TR Band (Large) without any complications. The 

patient tolerated the procedure well and there were no complications 

associated with the procedure. 



Vessel Analysis

The patient's coronary anatomy is right dominant.



The left main coronary artery is a large size vessel with diffuse 

calcification noted throughout this vessel and without significant 

stenosis. The left main trifurcates to the left anterior descending, 

circumflex, and ramus.



The left anterior descending artery is a medium size vessel with 

diffuse calcification noted throughout this vessel and with 

significant stenosis. There is a 80% stenosis in the proximal 

segment. Distal LAD has 50-60% stenosis The first diagonal branch is 

a medium size vessel with diffuse calcification noted throughout this 

vessel and without significant stenosis. There is a 60% stenosis in 

the proximal segment. 



The circumflex artery is a medium size vessel with diffuse 

calcification noted throughout this vessel and without significant 

stenosis. The first obtuse marginal branch is a small size vessel 

with diffuse calcification noted throughout this vessel and without 

significant stenosis. 



The ramus intermedius artery is a medium size vessel with diffuse 

calcification noted throughout this vessel and without significant 

stenosis. 



The right coronary artery is a medium size vessel with diffuse 

calcification noted throughout this vessel and with significant 

stenosis. There is a 95% stenosis in the proximal segment. Very 

distal RCA , well collateralized from LAD The right posterior 

descending artery is a small size vessel with diffuse calcification 

noted throughout this vessel and without significant stenosis. 



Left Ventricle

The left ventricle is normal in size with normal contractility. There 

was no cardiomyopathy. The left ventricular ejection fraction is 

estimated to be 55%. The left ventricular end diastolic pressure is 

15 mmHg. 



PCI Technique Lesion

Anticoagulation was achieved with Heparin and integrellin Bollus. 

Percutaneous coronary intervention was performed on the proximal left 

anterior descending artery segment. The lesion stenosis prior to 

intervention was 80% with NAVI flow. A 6 Fr XB 3.5 Guide Catheter was 

used to engage the ostium. A Luge 182 Interventional Guidewire was 

used to cross the lesion.



BALLOON DILATION

A Balloon catheter 2.0 x 15 mm Sprinter RX was inserted and inflated 

up to 12.00atm for 16seconds.



STENT DEPLOYMENT

A drug-eluting stent STENT RESOLUTE IQRA 3.0 X 22 was inserted and 

inflated up to 12.00atm for 16seconds.



POST STENT DEPLOYMENT BALLOON DILATION

A Balloon catheter 3.5 x 12 mm Trek RX NC was inserted and inflated 

up to 14.00atm for 16seconds.



Final angiography reveals 0 % stenosis with NAVI 3 flow.



PCI Technique Lesion 2

Percutaneous Coronary Intervention was performed on the Proximal 

right coronary artery. The lesion stenosis prior to intervention was 

95% with NAVI 1 flow. A 6 Fr JR 4 SH Guide Catheter was used to 

engage the ostium. A Luge 182 Interventional Guidewire was used to 

cross the lesion.



BALLOON DILATION

A Balloon catheter 2.0 x 15 mm Sprinter RX was inserted and inflated 

up to 12.00atm for 21seconds.



STENT DEPLOYMENT

A drug-eluting stent STENT RESOLUTE IQRA 2.75 X15 was inserted and 

inflated up to 14.00atm for 20seconds.



Final angiography reveals 0 % stenosis with NAVI 3 flow.



Conclusion

Multi Vessel CAD, Distal RCA , well collateralized from LAD

Distal RCA/ LAD poor target for CABG

preserved LV Fx, EF-555, EDP-15 mmof hg.

Successful PTCA with DAT of Proximal LAD and Proximal RCA



Recommendations

Smoking Cessation

Cardiac Rehabilitation ReferralDaily ASA with Plavix for at least one 

year

Aggressive Medical TherapyCardiac Risk Reduction Program

Weight Loss Reduction Program

Cc; ALLY Wiggins MD

## 2018-08-08 NOTE — CARD
--------------- APPROVED REPORT --------------





Date of service: 08/08/2018



EXAM: Two-dimensional and M-mode echocardiogram with Doppler and 

color Doppler.



INDICATION

Non STEMI



2D DIMENSIONS 

Left Atrium (2D)4.5   (1.6-4.0cm)IVSd1.0   (0.7-1.1cm)

LVDd4.8   (3.9-5.9cm)PWd1.2   (0.7-1.1cm)

LVDs3.3   (2.5-4.0cm)FS (%) 30.3   %

LVEF (%)57.5   (>50%)



M-Mode DIMENSIONS 

Aortic Root3.60   (2.2-3.7cm)Aortic Cusp Exc.1.90   (1.5-2.0cm)



Aortic Valve

AoV Peak Gqrjlffj934.0cm/Erin Peak GR.9mmHg



Mitral Valve

MV E Sosuycgu635.0cm/sMV A Zbmstiwp45.3cm/sE/A ratio1.0



TDI

Lateral E' Peak V8.48cm/sMedial E' Peak V8.48cm/sE/Lateral E'11.8

E/Medial E'11.8



Pulmonary Valve

PV Peak Yeodeprb37.7cm/sPV Peak Grad.2mmHg



Tricuspid Valve

TR Peak [id number]/sRAP [id number] Peak Gr.20mmHg

[id number]



 LEFT VENTRICLE 

The left ventricle is normal size. There is normal left ventricular 

wall thickness. The left ventricular function is normal. The left 

ventricular ejection fraction is within the normal range. There is 

normal LV segmental wall motion. Transmitral Doppler flow pattern is 

Grade I-abnormal relaxation pattern.



 RIGHT VENTRICLE 

The right ventricle is normal size. There is normal right ventricular 

wall thickness. The right ventricular systolic function is normal.



 ATRIA 

The left atrium is mildly dilated. The right atrium size is normal.



 AORTIC VALVE 

The aortic valve is mildly thickened. No aortic regurgitation is 

present. There is no aortic valvular stenosis. 



 MITRAL VALVE 

The mitral valve is mildly thickened. Mitral regurgitation is trace. 

There is no mitral valve stenosis.



 TRICUSPID VALVE 

The tricuspid valve is normal in structure. There is trace to mild 

tricuspid regurgitation.



 PULMONIC VALVE 

The pulmonary valve is normal in structure. There is mild pulmonic 

valvular regurgitation. 



 GREAT VESSELS 

The aortic root is normal in size.



<Conclusion>

The left ventricle is normal size.

There is normal left ventricular wall thickness.

The left ventricular function is normal.

The left ventricular ejection fraction is within the normal range.

There is normal LV segmental wall motion.

Transmitral Doppler flow pattern is Grade I-abnormal relaxation 

pattern.

## 2018-08-08 NOTE — CP.PCM.CON
History of Present Illness





- History of Present Illness


History of Present Illness: 





Charan Booth PGY 2 Neurology Consult for Dr. Wilde





Mr. Munroe is a 62-year-old male with a PMH of DM 2, GERD, PUD who presented with 

chest pain, body weakness and dizziness.  The pain was also associated with 

nausea and vomiting 1.  Overnight, troponin was noted to be elevated, and 

cardiology is treating patient for an NSTEMI.  Patient is scheduled for left 

heart cath this afternoon.  Neurology is consulted for left upper extremity 

resting tremor that the patient states he has been experiencing for 2 months.  

He states that he has been trying to make an appointment with a neurologist for 

2 months, but the earliest he could get was with Dr. Govea at [facility] 

at the end of August.  The patient first noted the tremor when he was praying, 

and holding his hands up to his chest.  The tremor is not present when he is 

moving his arm, or when he is holding his hands to his side.  The patient 

states that he has been active his entire life, until he retired last March and 

since then has been feeling lazy and not active.  The patient denies any gait 

instability, forgetfulness, numbness/tingling in any extremity, weakness or 

dropping any objects, or prior trauma to left arm/shoulder.  He also denies any 

changes in vision, headache, changes in hearing, dizziness, shortness of breath

, abdominal pain or fever/chills.  The patient is not aware of any family 

history of Parkinson's disease or any other neurological diseases or tremors.  

As far as he knows, his diabetes is well controlled on glipizide and dietary 

control. ROS was reviewed and is otherwise unremarkable.





PMH: As stated above


PSH: Not significant


Meds: As per MAR


Allergies: NKDA


SHX: Tobacco 1 pack per day for many years, denies EtOH or substance abuse.  

Used to work in a restaurant, supermarket and as ; now retired


SHX: DM 2 but no neurological diseases





Review of Systems





- Review of Systems


All systems: reviewed and no additional remarkable complaints except (as per HPI

)





Past Patient History





- Past Social History


Smoking Status: Heavy Smoker > 10 Cigarettes Daily


Alcohol: None


Drugs: Denies


Home Situation {Lives}: With Family





- CARDIAC


Hx Cardiac Disorders: No





- PULMONARY


Hx Respiratory Disorders: No





- NEUROLOGICAL


Hx Neurological Disorder: Yes


Other/Comment: left arm tremors x 2 months cause unknown





- HEENT


Hx HEENT Problems: No





- RENAL


Hx Chronic Kidney Disease: No





- ENDOCRINE/METABOLIC


Hx Diabetes Mellitus Type 2: Yes





- HEMATOLOGICAL/ONCOLOGICAL


Hx Blood Disorders: No





- INTEGUMENTARY


Hx Dermatological Problems: No





- MUSCULOSKELETAL/RHEUMATOLOGICAL


Hx Falls: No





- GASTROINTESTINAL


Hx Gastrointestinal Disorders: Yes


Hx Gastroesophageal Reflux: Yes


Hx Ulcer: Yes





- GENITOURINARY/GYNECOLOGICAL


Hx Genitourinary Disorders: No





- PSYCHIATRIC


Hx Psychophysiologic Disorder: No


Hx Substance Use: No





- SURGICAL HISTORY


Hx Surgeries: No





- ANESTHESIA


Hx Anesthesia: No


Hx Anesthesia Reactions: No


Hx Malignant Hyperthermia: No





Meds


Allergies/Adverse Reactions: 


 Allergies











Allergy/AdvReac Type Severity Reaction Status Date / Time


 


No Known Allergies Allergy   Verified 08/07/18 15:27














- Medications


Medications: 


 Current Medications





Aspirin (Ecotrin)  81 mg PO DAILY [facility]


   Last Admin: 08/08/18 09:38 Dose:  81 mg


Atorvastatin Calcium (Lipitor)  40 mg PO DIN [facility]


   Last Admin: 08/08/18 09:38 Dose:  40 mg


Clopidogrel Bisulfate (Plavix)  75 mg PO DAILY [facility]


   Last Admin: 08/08/18 09:38 Dose:  75 mg


Enoxaparin Sodium (Lovenox)  90 mg SC Q12H [facility]


   PRN Reason: Protocol


   Last Admin: 08/07/18 23:01 Dose:  90 mg


Insulin Human Regular (Humulin R Med)  0 units SC ACHS [facility]


   PRN Reason: Protocol


   Last Admin: 08/08/18 08:30 Dose:  Not Given


Metoprolol Tartrate (Lopressor)  25 mg PO BID [facility]


   Last Admin: 08/08/18 09:38 Dose:  25 mg


Pantoprazole Sodium (Protonix Ec Tab)  40 mg PO 0600 [facility]


   Last Admin: 08/08/18 06:42 Dose:  40 mg


Topiramate (Topamax)  50 mg PO DAILY [facility]


   PRN Reason: Protocol











Physical Exam





- Constitutional


Appears: Well, Non-toxic, No Acute Distress





- Head Exam


Head Exam: NORMAL INSPECTION





- Eye Exam


Eye Exam: EOMI, Normal appearance, PERRL





- ENT Exam


ENT Exam: Mucous Membranes Moist





- Neck Exam


Neck exam: Positive for: Full Rom, Normal Inspection.  Negative for: Meningismus

, Tenderness





- Respiratory Exam


Respiratory Exam: Clear to Auscultation Bilateral, NORMAL BREATHING PATTERN.  

absent: Rales, Rhonchi, Wheezes, Respiratory Distress





- Cardiovascular Exam


Cardiovascular Exam: RRR, +S1, +S2.  absent: Systolic Murmur





- GI/Abdominal Exam


GI & Abdominal Exam: Normal Bowel Sounds.  absent: Distended, Tenderness





- Extremities Exam


Extremities exam: Positive for: full ROM, normal inspection





- Back Exam


Back exam: NORMAL INSPECTION





- Neurological Exam


Neurological exam: Alert, CN II-XII Intact, Oriented x3, Reflexes Normal





- Expanded Neurological Exam


  ** Expanded


Patient oriented to: person, place, time


Cranial nerves: EOM's Intact: Normal


Ataxia: No (gait is stable w/ no swaying or trouble turning)


Cerebellar Function: Finger to Nose: Normal, Romberg: Normal


Upper motor neuron: Pronator Drift: Normal


Sensory exam: Lower Extremity Light Touch: Normal, Upper Extremity Light Touch: 

Normal


Neuro motor strength exam: Left Upper Extremity: 5, Right Upper Extremity: 5, 

Left Lower Extremity: 5, Right Lower Extremity: 5





- Psychiatric Exam


Psychiatric exam: Normal Mood





- Skin


Skin Exam: Normal Color





Results





- Vital Signs


Recent Vital Signs: 


 Last Vital Signs











Temp  97.8 F   08/08/18 05:58


 


Pulse  69   08/08/18 10:00


 


Resp  18   08/08/18 05:58


 


BP  121/74   08/08/18 09:38


 


Pulse Ox  97   08/08/18 05:58














- Labs


Result Diagrams: 


 08/08/18 03:25





 08/08/18 03:25


Labs: 


 Laboratory Results - last 24 hr











  08/07/18 08/07/18 08/08/18





  21:05 21:37 03:25


 


WBC    8.1


 


RBC    4.64


 


Hgb    13.5 L


 


Hct    39.6 L


 


MCV    85.3


 


MCH    29.1


 


MCHC    34.1


 


RDW    13.6


 


Plt Count    178


 


MPV    10.7


 


Gran %    61.4


 


Lymph % (Auto)    29.8


 


Mono % (Auto)    7.0 H


 


Eos % (Auto)    1.4 L


 


Baso % (Auto)    0.4


 


Gran #    4.99


 


Lymph # (Auto)    2.4


 


Mono # (Auto)    0.6


 


Eos # (Auto)    0.1


 


Baso # (Auto)    0.03


 


Sodium   


 


Potassium   


 


Chloride   


 


Carbon Dioxide   


 


Anion Gap   


 


BUN   


 


Creatinine   


 


Est GFR ( Amer)   


 


Est GFR (Non-Af Amer)   


 


POC Glucose (mg/dL)   228 H 


 


Random Glucose   


 


Calcium   


 


Total Bilirubin   


 


AST   


 


ALT   


 


Alkaline Phosphatase   


 


Troponin I  0.23 H* D  


 


Total Protein   


 


Albumin   


 


Globulin   


 


Albumin/Globulin Ratio   


 


TSH 3rd Generation   


 


Urine Color   


 


Urine Appearance   


 


Urine pH   


 


Ur Specific Gravity   


 


Urine Protein   


 


Urine Glucose (UA)   


 


Urine Ketones   


 


Urine Blood   


 


Urine Nitrate   


 


Urine Bilirubin   


 


Urine Urobilinogen   


 


Ur Leukocyte Esterase   














  08/08/18 08/08/18 08/08/18





  03:25 03:25 07:00


 


WBC   


 


RBC   


 


Hgb   


 


Hct   


 


MCV   


 


MCH   


 


MCHC   


 


RDW   


 


Plt Count   


 


MPV   


 


Gran %   


 


Lymph % (Auto)   


 


Mono % (Auto)   


 


Eos % (Auto)   


 


Baso % (Auto)   


 


Gran #   


 


Lymph # (Auto)   


 


Mono # (Auto)   


 


Eos # (Auto)   


 


Baso # (Auto)   


 


Sodium  141  


 


Potassium  4.0  


 


Chloride  108 H  


 


Carbon Dioxide  25  


 


Anion Gap  13  


 


BUN  14  


 


Creatinine  0.9  


 


Est GFR ( Amer)  > 60  


 


Est GFR (Non-Af Amer)  > 60  


 


POC Glucose (mg/dL)   


 


Random Glucose  127 H  


 


Calcium  8.8  


 


Total Bilirubin  0.5  


 


AST  15 L  


 


ALT  29  


 


Alkaline Phosphatase  51  


 


Troponin I  0.57 H* D  


 


Total Protein  6.5  


 


Albumin  3.6  


 


Globulin  2.9  


 


Albumin/Globulin Ratio  1.2  


 


TSH 3rd Generation   0.28 L 


 


Urine Color    Dark yellow


 


Urine Appearance    Clear


 


Urine pH    5.5


 


Ur Specific Gravity    1.025


 


Urine Protein    Negative


 


Urine Glucose (UA)    250 H


 


Urine Ketones    Negative


 


Urine Blood    Negative


 


Urine Nitrate    Negative


 


Urine Bilirubin    Negative


 


Urine Urobilinogen    0.2


 


Ur Leukocyte Esterase    Negative














Assessment & Plan





- Assessment and Plan (Free Text)


Assessment: 





62-year-old male with a PMH of DM 2, GERD, PUD presenting w/ chest pain, found 

to have an STEMI. On therapeutic Lovenox, ASA and Plavix, and will undergo LHC 

this afternoon with Dr. Covington. Patient also noted to have a LUE tremor x2 months

, for which neurology was consulted. Tremor is resting, and improves with 

movement. Tremor could be due to an essential tremor vs Parkinson's disease. 

Patient does not currently have gait instability, memory changes or drastic 

cogwheel rigidity, so will assume essential tremor but patient will require 

further outpatient follow-up. 








Plan: 





Tremor


- will start Topamax 50mg daily; given that it could affect renal function, 

will start it tomorrow following cath


- patient should continue Topamax 50mg HS as outpatient 


- should follow-up with Dr. Wilde in clinic when discharged safely


- continue PT 


- further recs per Dr. Wilde





Case was reviewed and discussed with attending, Dr. Wilde

## 2018-08-08 NOTE — CARD
--------------- APPROVED REPORT --------------





Date of service: 08/08/2018



EKG Measurement

Heart [id number]

[place] [id number]

[id number]

[id number]

[id number]



<Conclusion>

Normal sinus rhythm

Normal ECG

## 2018-08-08 NOTE — CP.PCM.PN
<Micheal Camacho - Last Filed: 08/08/18 13:07>





Subjective





- Date & Time of Evaluation


Date of Evaluation: 08/08/18


Time of Evaluation: 07:45





- Subjective


Subjective: 


Micheal Camacho PGY-1 Progress Note for Hospitalist Service





Patient seen and evaluated at bedside. Patient denies any current complaints of 

chest pain, palpitations, shortness of breath, dizziness, diaphoresis. No acute 

complaints overnight and patient slept well. 











Objective





- Vital Signs/Intake and Output


Vital Signs (last 24 hours): 


 











Temp Pulse Resp BP Pulse Ox


 


 98.3 F   59 L  15   130/64   97 


 


 08/08/18 12:00  08/08/18 12:00  08/08/18 12:00  08/08/18 12:00  08/08/18 05:58








Intake and Output: 


 











 08/08/18 08/08/18





 06:59 18:59


 


Intake Total 240 


 


Balance 240 














- Medications


Medications: 


 Current Medications





Aspirin (Ecotrin)  81 mg PO DAILY [facility]


   Last Admin: 08/08/18 09:38 Dose:  81 mg


Atorvastatin Calcium (Lipitor)  40 mg PO DIN [facility]


   Last Admin: 08/08/18 09:38 Dose:  40 mg


Clopidogrel Bisulfate (Plavix)  75 mg PO DAILY [facility]


   Last Admin: 08/08/18 09:38 Dose:  75 mg


Enoxaparin Sodium (Lovenox)  90 mg SC Q12H [facility]


   PRN Reason: Protocol


   Last Admin: 08/07/18 23:01 Dose:  90 mg


Insulin Human Regular (Humulin R Med)  0 units SC ACHS [facility]


   PRN Reason: Protocol


   Last Admin: 08/08/18 12:00 Dose:  Not Given


Metoprolol Tartrate (Lopressor)  25 mg PO BID [facility]


   Last Admin: 08/08/18 09:38 Dose:  25 mg


Pantoprazole Sodium (Protonix Ec Tab)  40 mg PO 0600 [facility]


   Last Admin: 08/08/18 06:42 Dose:  40 mg


Topiramate (Topamax)  50 mg PO DAILY [facility]


   PRN Reason: Protocol











- Labs


Labs: 


 





 08/08/18 03:25 





 08/08/18 03:25 





 











PT  12.0 SECONDS (9.4-12.5)   08/07/18  15:57    


 


INR  1.05   08/07/18  15:57    


 


APTT  25.1 Seconds (25.1-36.5)   08/07/18  15:57    














Physical Exam





- Constitutional


Appears: Well, Non-toxic, No Acute Distress





- Head Exam


Head Exam: ATRAUMATIC, NORMAL INSPECTION, NORMOCEPHALIC





- Eye Exam


Eye Exam: EOMI, Normal appearance


Pupil Exam: PERRL





- ENT Exam


ENT Exam: Mucous Membranes Moist, Normal Exam





- Neck Exam


Neck exam: Positive for: Normal Inspection





- Respiratory Exam


Respiratory Exam: Clear to Auscultation Bilateral, NORMAL BREATHING PATTERN.  

absent: Rales, Rhonchi, Wheezes





- Cardiovascular Exam


Cardiovascular Exam: RRR, +S1, +S2





- GI/Abdominal Exam


GI & Abdominal Exam: Normal Bowel Sounds, Soft.  absent: Tenderness





- Extremities Exam


Extremities exam: Positive for: normal inspection.  Negative for: calf 

tenderness, joint swelling, pedal edema





- Back Exam


Back exam: NORMAL INSPECTION





- Neurological Exam


Neurological exam: Alert, CN II-XII Intact, Oriented x3





- Psychiatric Exam


Psychiatric exam: Normal Affect, Normal Mood





- Skin


Skin Exam: Dry, Intact, Normal Color, Warm





Assessment and Plan





- Assessment and Plan (Free Text)


Assessment: 


Assessment: 


62 M with PMHx GERD, DM2, PUD who presented with chest pain, dizziness, and 

shortness of breath. Patient is comfortable and pain has resolved.





Chest Pain 2/2 NSTEMI


 and nitropaste given in ambulance which brought relief


Initial EKG performed showed nonspecific T wave changes in inferior leads, 

which was read as not requiring emergent catheterization


Troponin x1 negative. 2nd and 3rd trops were progressively elevated 0.23 and 

0.57


TSH 0.28


Repeat EKG in AM was unchanged from previous EKG


F/u echo 8/8


Lipid panel within normal limits


HHD mod carb


Therapeutic lovenox 90 BID currently on hold for procedure


ASA 81 daily, plavix 75


CXR on admission showed no active disease


Cardio consulted (Dr. Covington) - patient to undergo catheterization this 

afternoon. Currently NPO in preparation. Will f/u results and the need to 

restart dual antiplatelet therapy





DM2


noncompliance on Glipizide


Glucose in urine


accuchecks


ISS moderate


Diabetic education consulted


will continue to monitor





Unilateral UE Tremor


paroxysmal. No focal deficits.


Not evident during exam


CT head w/o contrast was negative


neurology consulted (Dr. Wilde) - recs to begin Topamax 50 PO tomorrow 





Ppx: 


Protonix


SCD





Patient seen, case reviewed, and plan discussed with Dr. Roy.





Micheal Camacho, PGY-1








<Dajuan Roy - Last Filed: 08/08/18 14:15>





Objective





- Vital Signs/Intake and Output


Vital Signs (last 24 hours): 


 











Temp Pulse Resp BP Pulse Ox


 


 98.3 F   59 L  15   130/64   97 


 


 08/08/18 12:00  08/08/18 12:00  08/08/18 12:00  08/08/18 12:00  08/08/18 05:58








Intake and Output: 


 











 08/08/18 08/08/18





 06:59 18:59


 


Intake Total 240 


 


Balance 240 














- Medications


Medications: 


 Current Medications





Aspirin (Ecotrin)  81 mg PO DAILY [facility]


   Last Admin: 08/08/18 09:38 Dose:  81 mg


Atorvastatin Calcium (Lipitor)  40 mg PO DIN [facility]


   Last Admin: 08/08/18 09:38 Dose:  40 mg


Clopidogrel Bisulfate (Plavix)  75 mg PO DAILY [facility]


   Last Admin: 08/08/18 09:38 Dose:  75 mg


Enoxaparin Sodium (Lovenox)  90 mg SC Q12H [facility]


   PRN Reason: Protocol


   Last Admin: 08/07/18 23:01 Dose:  90 mg


Insulin Human Regular (Humulin R Med)  0 units SC ACHS [facility]


   PRN Reason: Protocol


   Last Admin: 08/08/18 12:00 Dose:  Not Given


Metoprolol Tartrate (Lopressor)  25 mg PO BID [facility]


   Last Admin: 08/08/18 09:38 Dose:  25 mg


Pantoprazole Sodium (Protonix Ec Tab)  40 mg PO 0600 [facility]


   Last Admin: 08/08/18 06:42 Dose:  40 mg


Topiramate (Topamax)  50 mg PO DAILY [facility]


   PRN Reason: Protocol











- Labs


Labs: 


 





 08/08/18 03:25 





 08/08/18 03:25 





 











PT  12.0 SECONDS (9.4-12.5)   08/07/18  15:57    


 


INR  1.05   08/07/18  15:57    


 


APTT  25.1 Seconds (25.1-36.5)   08/07/18  15:57    














Attending/Attestation





- Attestation


I have personally seen and examined this patient.: Yes


I have fully participated in the care of the patient.: Yes


I have reviewed all pertinent clinical information, including history, physical 

exam and plan: Yes


Notes (Text): 





08/08/18 14:07


Medical record note made by the resident after discussion with my direction and 

input after the patient was personally seen and examined by me. I have reviewed 

the chart and agree that the record accurately reflects by personal performance 

of the history, physical exam, data review, and medical decision-making, in the 

course for the patient. I have also personally directed the plan of care.





62 M with PMH of GERD, PUD, and NIDDM with NSEMI.


Patient is currently pain free, on  ASA,Plavix, lovenox,Metoprolol and lipitor .


Patient is pain free, schedule for cardiac cath.





Management plan was discussed in detail with patient and family. 


Education was provided.

## 2018-08-09 VITALS
TEMPERATURE: 98.2 F | SYSTOLIC BLOOD PRESSURE: 121 MMHG | HEART RATE: 59 BPM | DIASTOLIC BLOOD PRESSURE: 74 MMHG | RESPIRATION RATE: 18 BRPM

## 2018-08-09 VITALS — OXYGEN SATURATION: 96 %

## 2018-08-09 LAB
BUN SERPL-MCNC: 12 MG/DL (ref 7–21)
CALCIUM SERPL-MCNC: 9.2 MG/DL (ref 8.4–10.5)
ERYTHROCYTE [DISTWIDTH] IN BLOOD BY AUTOMATED COUNT: 13.4 % (ref 11.5–14.5)
GFR NON-AFRICAN AMERICAN: > 60
HGB BLD-MCNC: 13.4 G/DL (ref 14–18)
MCH RBC QN AUTO: 29.5 PG (ref 25–35)
MCHC RBC AUTO-ENTMCNC: 34.4 G/DL (ref 31–37)
MCV RBC AUTO: 85.7 FL (ref 80–105)
PLATELET # BLD: 189 10^3/UL (ref 120–450)
PMV BLD AUTO: 10.6 FL (ref 7–11)
RBC # BLD AUTO: 4.54 10^6/UL (ref 3.5–6.1)
WBC # BLD AUTO: 7.5 10^3/UL (ref 4.5–11)

## 2018-08-09 RX ADMIN — PANTOPRAZOLE SODIUM SCH: 40 TABLET, DELAYED RELEASE ORAL at 05:33

## 2018-08-09 RX ADMIN — INSULIN HUMAN SCH: 100 INJECTION, SOLUTION PARENTERAL at 07:57

## 2018-08-09 RX ADMIN — INSULIN HUMAN SCH: 100 INJECTION, SOLUTION PARENTERAL at 11:55

## 2018-08-09 NOTE — CP.PCM.PN
Subjective





- Date & Time of Evaluation


Date of Evaluation: 08/09/18


Time of Evaluation: 07:15





- Subjective


Subjective: 





Awake, denies chest pain, post PTCA





Reason for consultation and follow up: Cardiac evaluation of unstable angina,

NSTEMI





Seen and examined by me and Dr. Covington





Objective





- Vital Signs/Intake and Output


Vital Signs (last 24 hours): 


 











Temp Pulse Resp BP Pulse Ox


 


 98.0 F   63   20   130/50 L  96 


 


 08/09/18 06:00  08/09/18 09:27  08/09/18 06:00  08/09/18 09:27  08/09/18 06:00








Intake and Output: 


 











 08/09/18 08/09/18





 06:59 18:59


 


Intake Total 565 


 


Balance 565 














- Medications


Medications: 


 Current Medications





Aspirin (Ecotrin)  81 mg PO DAILY [facility]


   Last Admin: 08/09/18 09:27 Dose:  81 mg


Atorvastatin Calcium (Lipitor)  40 mg PO DIN [facility]


   Last Admin: 08/08/18 09:38 Dose:  40 mg


Clopidogrel Bisulfate (Plavix)  75 mg PO DAILY [facility]


   Last Admin: 08/09/18 09:27 Dose:  75 mg


Enoxaparin Sodium (Lovenox)  90 mg SC Q12H [facility]


   PRN Reason: Protocol


   Last Admin: 08/07/18 23:01 Dose:  90 mg


Insulin Human Regular (Humulin R Med)  0 units SC ACHS [facility]


   PRN Reason: Protocol


   Last Admin: 08/09/18 07:57 Dose:  Not Given


Lisinopril (Zestril)  2.5 mg PO DAILY [facility]


   Last Admin: 08/09/18 09:26 Dose:  2.5 mg


Metoprolol Tartrate (Lopressor)  25 mg PO BID [facility]


   Last Admin: 08/09/18 09:27 Dose:  25 mg


Pantoprazole Sodium (Protonix Ec Tab)  40 mg PO 0600 [facility]


   Last Admin: 08/09/18 05:33 Dose:  Not Given


Topiramate (Topamax)  50 mg PO DAILY [facility]


   PRN Reason: Protocol


   Last Admin: 08/09/18 09:26 Dose:  50 mg











- Labs


Labs: 


 





 08/08/18 21:15 





 08/08/18 21:15 





 











PT  12.0 SECONDS (9.4-12.5)   08/07/18  15:57    


 


INR  1.05   08/07/18  15:57    


 


APTT  25.1 Seconds (25.1-36.5)   08/07/18  15:57    














- Constitutional


Appears: No Acute Distress





- Eye Exam


Eye Exam: Normal appearance





- ENT Exam


ENT Exam: Mucous Membranes Moist





- Respiratory Exam


Respiratory Exam: Clear to Ausculation Bilateral, NORMAL BREATHING PATTERN





- Cardiovascular Exam


Cardiovascular Exam: +S1, +S2


Additional comments: 





denies chest pain





- GI/Abdominal Exam


GI & Abdominal Exam: Soft, Normal Bowel Sounds





- Extremities Exam


Extremities Exam: Normal Capillary Refill


Additional comments: 





left radial no bleeding,no hematoma





- Neurological Exam


Neurological Exam: Alert, Awake, Oriented x3





- Psychiatric Exam


Psychiatric exam: Normal Affect





- Skin


Skin Exam: Intact, Warm





Assessment and Plan





- Assessment and Plan (Free Text)


Assessment: 





A 62 year old male who came in to the ER due to chest pain. NSTEMI.History of 

GERD, peptic ulcer disease,diabetes mellitus, current smoker,  Cardiac cath 

done yesterday and results showed coronary disease on proximal LAD and Proximal 

RCA. PTCA/stent were done successfully. denies chest pain or shortness of 

breath today.











Plan: 





Denies shortness of breath or chest pain


Successful PTCA/stent of proximal LAD and proximal RCA


On Plavix and ASA


Controlled heart rate and blood pressure


Smoking cessation


Lifestyle modification


Continue current treatment


Continue current medications


Possible discharge today





Follow up in office in 2-3 weeks





Plan and treatment discussed with Dr. Covington

## 2018-08-09 NOTE — CARD
--------------- APPROVED REPORT --------------





Date of service: 08/08/2018



EKG Measurement

Heart [id number]

[place] [id number]

[id number]

[id number]

[id number]



<Conclusion>

Sinus bradycardia

Otherwise normal ECG

## 2018-08-09 NOTE — CP.PCM.PN
Subjective





- Date & Time of Evaluation


Date of Evaluation: 08/09/18


Time of Evaluation: 08:59





- Subjective


Subjective: 





Charan Booth PGY 2 Neurology Progress Note for Dr. Wilde





Patient was seen and examined at bedside. He states that he tolerated the 

catheter procedure very well and that he has no chest pain currently. There 

were no acute overnight events, and he is making urine. He has however refused 

his morning labs stating that he feels fine. I educated the patient on the fact 

that we need to evaluate his CBC and CMP for bleeding and kidney function and 

for any post-cath complications. The patient insists that he doesn't want labs 

drawn. He is informed that he will be started on Topamax today and that he 

should follow-up with Dr. Wilde in clinic when discharged.





Objective





- Vital Signs/Intake and Output


Vital Signs (last 24 hours): 


 











Temp Pulse Resp BP Pulse Ox


 


 98.0 F   61   20   132/50 L  96 


 


 08/09/18 06:00  08/09/18 06:00  08/09/18 06:00  08/09/18 06:00  08/09/18 06:00








Intake and Output: 


 











 08/09/18 08/09/18





 06:59 18:59


 


Intake Total 565 


 


Balance 565 














- Medications


Medications: 


 Current Medications





Aspirin (Ecotrin)  81 mg PO DAILY [facility]


   Last Admin: 08/08/18 09:38 Dose:  81 mg


Atorvastatin Calcium (Lipitor)  40 mg PO DIN [facility]


   Last Admin: 08/08/18 09:38 Dose:  40 mg


Clopidogrel Bisulfate (Plavix)  75 mg PO DAILY [facility]


   Last Admin: 08/08/18 09:38 Dose:  75 mg


Enoxaparin Sodium (Lovenox)  90 mg SC Q12H [facility]


   PRN Reason: Protocol


   Last Admin: 08/07/18 23:01 Dose:  90 mg


Insulin Human Regular (Humulin R Med)  0 units SC ACHS [facility]


   PRN Reason: Protocol


   Last Admin: 08/09/18 07:57 Dose:  Not Given


Lisinopril (Zestril)  2.5 mg PO DAILY [facility]


Metoprolol Tartrate (Lopressor)  25 mg PO BID [facility]


   Last Admin: 08/08/18 18:18 Dose:  25 mg


Pantoprazole Sodium (Protonix Ec Tab)  40 mg PO 0600 [facility]


   Last Admin: 08/09/18 05:33 Dose:  Not Given


Topiramate (Topamax)  50 mg PO DAILY [facility]


   PRN Reason: Protocol











- Labs


Labs: 


 





 08/08/18 21:15 





 08/08/18 21:15 





 











PT  12.0 SECONDS (9.4-12.5)   08/07/18  15:57    


 


INR  1.05   08/07/18  15:57    


 


APTT  25.1 Seconds (25.1-36.5)   08/07/18  15:57    














- Constitutional


Appears: Well, Non-toxic, No Acute Distress





- Head Exam


Head Exam: NORMAL INSPECTION





- Eye Exam


Eye Exam: EOMI, Normal appearance, PERRL





- ENT Exam


ENT Exam: Mucous Membranes Moist





- Neck Exam


Neck Exam: Normal Inspection





- Respiratory Exam


Respiratory Exam: Clear to Ausculation Bilateral, NORMAL BREATHING PATTERN.  

absent: Rales, Rhonchi, Wheezes





- Cardiovascular Exam


Cardiovascular Exam: RRR, +S1, +S2.  absent: Murmur





- GI/Abdominal Exam


GI & Abdominal Exam: Soft, Normal Bowel Sounds.  absent: Distended, Tenderness





- Extremities Exam


Extremities Exam: Full ROM.  absent: Pedal Edema


Additional comments: 





left radial cath site is without dressing, but no active bleeding or hematoma 

noted








- Back Exam


Back Exam: NORMAL INSPECTION





- Neurological Exam


Neurological Exam: Alert, Awake, CN II-XII Intact, Oriented x3


Neuro motor strength exam: Left Upper Extremity: 5, Right Upper Extremity: 5, 

Left Lower Extremity: 5, Right Lower Extremity: 5





- Psychiatric Exam


Psychiatric exam: Normal Mood





- Skin


Skin Exam: Warm





Assessment and Plan





- Assessment and Plan (Free Text)


Assessment: 





62-year-old male with a PMH of DM 2, GERD, PUD and tobacco use presenting w/ 

chest pain, found to have an NSTEMI s/p PTCA w/ DAT of LAD and RCA with Dr. Covington. Patient also noted to have a LUE tremor x2 months, for which neurology 

was consulted. Tremor is noted at rest, and improves with movement. Tremor 

could be due to an essential tremor vs Parkinson's disease. Patient does not 

currently have gait instability, memory changes or drastic cogwheel rigidity, 

so will assume essential tremor but patient will require further outpatient 

follow-up. 








Plan: 





Tremor


- will start Topamax 50mg daily today


- patient should continue Topamax 50mg HS as outpatient 


- should follow-up with Dr. Wilde in clinic when discharged safely


- Echo was done and showed normal LV systolic function and no major 

abnormalities 


- continue PT 


- advised regarding smoking cessation and its benefits


- advised to comply with medications, especially DAPT s/p cath


- strict glycemic control


- further recs per Dr. Covington


- further recs per Dr. Wilde





Case was reviewed and discussed with attending, Dr. Wilde

## 2018-08-09 NOTE — CP.PCM.DIS
<Micheal Camacho - Last Filed: 08/09/18 15:49>





Provider





- Provider


Date of Admission: 


08/08/18 14:03





Attending physician: 


Dajuan Roy MD





Primary care physician: 





Dr. Rodriguez


Consults: 


cardio - Dr. Covington


Neuro - Dr. Wilde





Time Spent in preparation of Discharge (in minutes): 45





Hospital Course





- Lab Results


Lab Results: 


 Most Recent Lab Values











WBC  7.5 10^3/ul (4.5-11.0)   08/09/18  10:40    


 


RBC  4.54 10^6/uL (3.5-6.1)   08/09/18  10:40    


 


Hgb  13.4 g/dL (14.0-18.0)  L  08/09/18  10:40    


 


Hct  38.9 % (42.0-52.0)  L  08/09/18  10:40    


 


MCV  85.7 fl (80.0-105.0)   08/09/18  10:40    


 


MCH  29.5 pg (25.0-35.0)   08/09/18  10:40    


 


MCHC  34.4 g/dl (31.0-37.0)   08/09/18  10:40    


 


RDW  13.4 % (11.5-14.5)   08/09/18  10:40    


 


Plt Count  189 10^3/uL (120.0-450.0)   08/09/18  10:40    


 


MPV  10.6 fl (7.0-11.0)   08/09/18  10:40    


 


Gran %  57.8 % (50.0-68.0)   08/08/18  21:15    


 


Lymph % (Auto)  33.2 % (22.0-35.0)   08/08/18  21:15    


 


Mono % (Auto)  6.5 % (1.0-6.0)  H  08/08/18  21:15    


 


Eos % (Auto)  2.1 % (1.5-5.0)   08/08/18  21:15    


 


Baso % (Auto)  0.4 % (0.0-3.0)   08/08/18  21:15    


 


Gran #  4.43  (1.4-6.5)   08/08/18  21:15    


 


Lymph # (Auto)  2.5  (1.2-3.4)   08/08/18  21:15    


 


Mono # (Auto)  0.5  (0.1-0.6)   08/08/18  21:15    


 


Eos # (Auto)  0.2  (0.0-0.7)   08/08/18  21:15    


 


Baso # (Auto)  0.03 K/mm3 (0.0-2.0)   08/08/18  21:15    


 


PT  12.0 SECONDS (9.4-12.5)   08/07/18  15:57    


 


INR  1.05   08/07/18  15:57    


 


APTT  25.1 Seconds (25.1-36.5)   08/07/18  15:57    


 


Sodium  140 mmol/L (132-148)   08/09/18  10:40    


 


Potassium  5.2 mmol/L (3.6-5.0)  H  08/09/18  10:40    


 


Chloride  102 mmol/L ()   08/09/18  10:40    


 


Carbon Dioxide  30 mmol/L (21-33)   08/09/18  10:40    


 


Anion Gap  13  (10-20)   08/09/18  10:40    


 


BUN  12 mg/dL (7-21)   08/09/18  10:40    


 


Creatinine  0.8 mg/dl (0.8-1.5)   08/09/18  10:40    


 


Est GFR ( Amer)  > 60   08/09/18  10:40    


 


Est GFR (Non-Af Amer)  > 60   08/09/18  10:40    


 


POC Glucose (mg/dL)  112 mg/dL ()  H  08/09/18  07:55    


 


Random Glucose  172 mg/dL ()  H  08/09/18  10:40    


 


Hemoglobin A1c  7.8 % (4.2-6.5)  H  08/07/18  21:05    


 


Calcium  9.2 mg/dL (8.4-10.5)   08/09/18  10:40    


 


Phosphorus  2.3 mg/dL (2.5-4.5)  L  08/07/18  15:57    


 


Magnesium  2.0 mg/dL (1.7-2.2)   08/07/18  15:57    


 


Total Bilirubin  0.5 mg/dL (0.2-1.3)   08/08/18  03:25    


 


AST  15 U/L (17-59)  L  08/08/18  03:25    


 


ALT  29 U/L (7-56)   08/08/18  03:25    


 


Alkaline Phosphatase  51 U/L ()   08/08/18  03:25    


 


Lactate Dehydrogenase  364 U/L (333-699)   08/07/18  15:57    


 


Total Creatine Kinase  62 U/L ()   08/07/18  15:57    


 


Troponin I  0.57 ng/mL H* D 08/08/18  03:25    


 


Total Protein  6.5 g/dL (5.8-8.3)   08/08/18  03:25    


 


Albumin  3.6 g/dL (3.0-4.8)   08/08/18  03:25    


 


Globulin  2.9 gm/dL  08/08/18  03:25    


 


Albumin/Globulin Ratio  1.2  (1.1-1.8)   08/08/18  03:25    


 


Triglycerides  120 mg/dL ()   08/07/18  15:57    


 


Cholesterol  162 mg/dL (130-200)   08/07/18  15:57    


 


LDL Cholesterol Direct  107 mg/dL (0-129)   08/07/18  15:57    


 


HDL Cholesterol  31 mg/dL (29-60)   08/07/18  15:57    


 


Lipase  249 U/L ()   08/07/18  15:57    


 


TSH 3rd Generation  0.28 mIU/mL (0.46-4.68)  L  08/08/18  03:25    


 


Urine Color  Dark yellow  (YELLOW)   08/08/18  07:00    


 


Urine Appearance  Clear  (CLEAR)   08/08/18  07:00    


 


Urine pH  5.5  (4.7-8.0)   08/08/18  07:00    


 


Ur Specific Gravity  1.025  (1.005-1.035)   08/08/18  07:00    


 


Urine Protein  Negative mg/dL (<30 mg/dL)   08/08/18  07:00    


 


Urine Glucose (UA)  250 mg/dL (NEGATIVE)  H  08/08/18  07:00    


 


Urine Ketones  Negative mg/dL (NEGATIVE)   08/08/18  07:00    


 


Urine Blood  Negative  (NEGATIVE)   08/08/18  07:00    


 


Urine Nitrate  Negative  (NEGATIVE)   08/08/18  07:00    


 


Urine Bilirubin  Negative  (NEGATIVE)   08/08/18  07:00    


 


Urine Urobilinogen  0.2 E.U./dL (<1 E.U./dL)   08/08/18  07:00    


 


Ur Leukocyte Esterase  Negative Silvino/uL (NEGATIVE)   08/08/18  07:00    














- Hospital Course


Hospital Course: 


Micheal Camacho, PGY-1 Discharge Summary for Hospitalist Service





CC: 62 M with PMHx GERD, DM2, PUD who presented with chest pain, dizziness, and 

shortness of breath.





Hospital Course: 


62 M who presented with dizziness and chest pain. Troponins were ordered, and 

while the first was negative, the 2nd and 3rd troponins increased to 0.23 and 

0.57 respectively.  and nitropaste given in ER. 


CXR on admission showed no active disease


Initial EKG performed showed nonspecific T wave changes in inferior leads, 

which was read as not requiring emergent catheterization.


Repeat EKG in AM was unchanged from previous EKG, but due to elevated troponins

, patient was scheduled for catheterization on afternoon of 8/8. NSTEMI 

diagnosis was made.


Follow up echo showed an EF of 57.5%. Grade 1 abnormal relaxation pattern.  

Lipid panel was within normal limits.


Patient was medically optimized with ASA 81 daily, plavix 75, lipitor, 

metoprolol and Lisinopril.





Cardio was consulted (Dr. Covington), and decision was made for patient to undergo 

catheterization. NPO in preparation. Therapeutic lovenox 90 BID was put on hold 

for procedure. The procedure report showed 80% stenosis of LAD and 95% stenosis 

of proximal RCA with distal RCA occlusion. 2 drug eluting stents were place, 

one in the pLAD and one in pRCA. 





Patient also noted to have a LUE tremor x2 months, for which neurology was 

consulted. Tremor is noted at rest, and improves with movement. Tremor could be 

due to an essential tremor vs Parkinson's disease. Ct head was negative. 

Patient does not currently have gait instability, memory changes or drastic 

cogwheel rigidity, so patient will require further outpatient follow-up. 

Patient to began Topamax 50 mg today. Patient was instructed to follow up in 

clinic with Dr. Wilde.





Patient also has DM2, for which the patient is noncompliant on his mediations. 

Patient was put on accuchecks on moderate ISS.





Diabetic education, medication compliance, and follow up to catheterization 

procedure was discussed with the patient at length.


Patient is in no acute distress, is in full capacity and wishes to go home. 

Patient is hemodynamically stable, without any acute complaints or pain.





Please refer to chart for further details.





Patient seen, case reviewed, and plan discussed with Dr. Roy.











Discharge Exam





- Head Exam


Head Exam: NORMAL INSPECTION


Additional comments: 








Physical Exam





- Constitutional


Appears: Well, Non-toxic, No Acute Distress





- Head Exam


Head Exam: ATRAUMATIC, NORMAL INSPECTION, NORMOCEPHALIC





- Eye Exam


Eye Exam: EOMI, Normal appearance


Pupil Exam: PERRL





- ENT Exam


ENT Exam: Mucous Membranes Moist, Normal Exam





- Neck Exam


Neck exam: Positive for: Normal Inspection





- Respiratory Exam


Respiratory Exam: Clear to Auscultation Bilateral, NORMAL BREATHING PATTERN.  

absent: Rales, Rhonchi, Wheezes





- Cardiovascular Exam


Cardiovascular Exam: RRR, +S1, +S2





- GI/Abdominal Exam


GI & Abdominal Exam: Normal Bowel Sounds, Soft.  absent: Tenderness





- Extremities Exam


Extremities exam: Positive for: normal inspection.  Negative for: calf 

tenderness, joint swelling, pedal edema


Left radial cath placement with small 1 cm bruise. No acute bleeding or 

tenderness noted.





- Back Exam


Back exam: NORMAL INSPECTION





- Neurological Exam


Neurological exam: Alert, CN II-XII Intact, Oriented x3





- Psychiatric Exam


Psychiatric exam: Normal Affect, Normal Mood





- Skin


Skin Exam: Dry, Intact, Normal Color, Warm








Discharge Plan





- Discharge Medications


Prescriptions: 


Aspirin [Ecotrin] 325 mg PO DAILY #30 tabec


Atorvastatin [Lipitor] 40 mg PO DIN #30 tab


Clopidogrel [Plavix] 75 mg PO DAILY #30 tab


Metoprolol Tartrate [Lopressor] 25 mg PO BID #60 tab


Pantoprazole [Protonix EC Tab] 40 mg PO 0600 #30 ect


Topiramate [Topamax] 50 mg PO DAILY #30 tab





- Follow Up Plan


Condition: FAIR


Disposition: HOME/ ROUTINE


Patient education suggested?: Yes


Instructions:  Cardiac Catheterization, Coronary Angioplasty (DC), Coronary 

Stenting (DC), Shortness of Breath (Dyspnea) (DC), Chest Pain (DC), Generalized 

Weakness (DC)


Additional Instructions: 


Patient should take all medications as prescribed.


Patient should follow up with Dr. Rodriguez for follow up BMP, diabetes control, 

and post-cath follow up.


Patient should get a BMP blood work on Monday August 13 in advance of coming to 

health clinic.


Please follow up with cardiologist (Dr. Covington) within one week.


Please follow up with Dr. Wilde (neurologist) as an outpatient.


Will defer to cardiology with regards to restarting other medications (ACE-

inhibitor). 


Should symptoms reoccur or worsen, please return to nearest emergency 

department.





Referrals: 


Ml Wilde MD [Staff Provider] -  (Please call to make an appointment)


Dajuan Covington MD [Staff Provider] - 





<Dajuan Roy - Last Filed: 08/09/18 17:01>





Provider





- Provider


Date of Admission: 


08/08/18 14:03





Attending physician: 


Dajuan Roy MD








Hospital Course





- Lab Results


Lab Results: 


 Most Recent Lab Values











WBC  7.5 10^3/ul (4.5-11.0)   08/09/18  10:40    


 


RBC  4.54 10^6/uL (3.5-6.1)   08/09/18  10:40    


 


Hgb  13.4 g/dL (14.0-18.0)  L  08/09/18  10:40    


 


Hct  38.9 % (42.0-52.0)  L  08/09/18  10:40    


 


MCV  85.7 fl (80.0-105.0)   08/09/18  10:40    


 


MCH  29.5 pg (25.0-35.0)   08/09/18  10:40    


 


MCHC  34.4 g/dl (31.0-37.0)   08/09/18  10:40    


 


RDW  13.4 % (11.5-14.5)   08/09/18  10:40    


 


Plt Count  189 10^3/uL (120.0-450.0)   08/09/18  10:40    


 


MPV  10.6 fl (7.0-11.0)   08/09/18  10:40    


 


Gran %  57.8 % (50.0-68.0)   08/08/18  21:15    


 


Lymph % (Auto)  33.2 % (22.0-35.0)   08/08/18  21:15    


 


Mono % (Auto)  6.5 % (1.0-6.0)  H  08/08/18  21:15    


 


Eos % (Auto)  2.1 % (1.5-5.0)   08/08/18  21:15    


 


Baso % (Auto)  0.4 % (0.0-3.0)   08/08/18  21:15    


 


Gran #  4.43  (1.4-6.5)   08/08/18  21:15    


 


Lymph # (Auto)  2.5  (1.2-3.4)   08/08/18  21:15    


 


Mono # (Auto)  0.5  (0.1-0.6)   08/08/18  21:15    


 


Eos # (Auto)  0.2  (0.0-0.7)   08/08/18  21:15    


 


Baso # (Auto)  0.03 K/mm3 (0.0-2.0)   08/08/18  21:15    


 


PT  12.0 SECONDS (9.4-12.5)   08/07/18  15:57    


 


INR  1.05   08/07/18  15:57    


 


APTT  25.1 Seconds (25.1-36.5)   08/07/18  15:57    


 


Sodium  140 mmol/L (132-148)   08/09/18  10:40    


 


Potassium  5.2 mmol/L (3.6-5.0)  H  08/09/18  10:40    


 


Chloride  102 mmol/L ()   08/09/18  10:40    


 


Carbon Dioxide  30 mmol/L (21-33)   08/09/18  10:40    


 


Anion Gap  13  (10-20)   08/09/18  10:40    


 


BUN  12 mg/dL (7-21)   08/09/18  10:40    


 


Creatinine  0.8 mg/dl (0.8-1.5)   08/09/18  10:40    


 


Est GFR ( Amer)  > 60   08/09/18  10:40    


 


Est GFR (Non-Af Amer)  > 60   08/09/18  10:40    


 


POC Glucose (mg/dL)  112 mg/dL ()  H  08/09/18  07:55    


 


Random Glucose  172 mg/dL ()  H  08/09/18  10:40    


 


Hemoglobin A1c  7.8 % (4.2-6.5)  H  08/07/18  21:05    


 


Calcium  9.2 mg/dL (8.4-10.5)   08/09/18  10:40    


 


Phosphorus  2.3 mg/dL (2.5-4.5)  L  08/07/18  15:57    


 


Magnesium  2.0 mg/dL (1.7-2.2)   08/07/18  15:57    


 


Total Bilirubin  0.5 mg/dL (0.2-1.3)   08/08/18  03:25    


 


AST  15 U/L (17-59)  L  08/08/18  03:25    


 


ALT  29 U/L (7-56)   08/08/18  03:25    


 


Alkaline Phosphatase  51 U/L ()   08/08/18  03:25    


 


Lactate Dehydrogenase  364 U/L (333-699)   08/07/18  15:57    


 


Total Creatine Kinase  62 U/L ()   08/07/18  15:57    


 


Troponin I  0.57 ng/mL H* D 08/08/18  03:25    


 


Total Protein  6.5 g/dL (5.8-8.3)   08/08/18  03:25    


 


Albumin  3.6 g/dL (3.0-4.8)   08/08/18  03:25    


 


Globulin  2.9 gm/dL  08/08/18  03:25    


 


Albumin/Globulin Ratio  1.2  (1.1-1.8)   08/08/18  03:25    


 


Triglycerides  120 mg/dL ()   08/07/18  15:57    


 


Cholesterol  162 mg/dL (130-200)   08/07/18  15:57    


 


LDL Cholesterol Direct  107 mg/dL (0-129)   08/07/18  15:57    


 


HDL Cholesterol  31 mg/dL (29-60)   08/07/18  15:57    


 


Lipase  249 U/L ()   08/07/18  15:57    


 


TSH 3rd Generation  0.28 mIU/mL (0.46-4.68)  L  08/08/18  03:25    


 


Urine Color  Dark yellow  (YELLOW)   08/08/18  07:00    


 


Urine Appearance  Clear  (CLEAR)   08/08/18  07:00    


 


Urine pH  5.5  (4.7-8.0)   08/08/18  07:00    


 


Ur Specific Gravity  1.025  (1.005-1.035)   08/08/18  07:00    


 


Urine Protein  Negative mg/dL (<30 mg/dL)   08/08/18  07:00    


 


Urine Glucose (UA)  250 mg/dL (NEGATIVE)  H  08/08/18  07:00    


 


Urine Ketones  Negative mg/dL (NEGATIVE)   08/08/18  07:00    


 


Urine Blood  Negative  (NEGATIVE)   08/08/18  07:00    


 


Urine Nitrate  Negative  (NEGATIVE)   08/08/18  07:00    


 


Urine Bilirubin  Negative  (NEGATIVE)   08/08/18  07:00    


 


Urine Urobilinogen  0.2 E.U./dL (<1 E.U./dL)   08/08/18  07:00    


 


Ur Leukocyte Esterase  Negative Silvino/uL (NEGATIVE)   08/08/18  07:00    














Attending/Attestation





- Attestation


I have personally seen and examined this patient.: Yes


I have fully participated in the care of the patient.: Yes


I have reviewed all pertinent clinical information, including history, physical 

exam and plan: Yes


Notes (Text): 





08/09/18 16:56


Medical record note made by the resident after discussion with my direction and 

input after the patient was personally seen and examined by me. I have reviewed 

the chart and agree that the record accurately reflects by personal performance 

of the history, physical exam, data review, and medical decision-making, in the 

course for the patient. I have also personally directed the plan of care.





62 year old male  with PMH of GERD, peptic ulcer disease,diabetes mellitus, 

current smoke  wasa dmitted with . NSTEMI .Patient underwent  Cardiac cath 

yesterday and results showed coronary disease on proximal LAD and Proximal RCA. 

PTCA/stent were done successfully.Patient remain stable after the procedure.He 

will be discharged home on ASA/Plavix/Metoprolol/Lipitor.





The issue of compl;iance with medication was discussed in detail with him.





Issue of chronic smoking was also discussed in detail.





Management plan was discussed in detail with patient. Education was provided